# Patient Record
Sex: FEMALE | Race: WHITE | NOT HISPANIC OR LATINO
[De-identification: names, ages, dates, MRNs, and addresses within clinical notes are randomized per-mention and may not be internally consistent; named-entity substitution may affect disease eponyms.]

---

## 2017-02-07 ENCOUNTER — APPOINTMENT (OUTPATIENT)
Dept: ENDOCRINOLOGY | Facility: CLINIC | Age: 52
End: 2017-02-07

## 2017-02-07 ENCOUNTER — LABORATORY RESULT (OUTPATIENT)
Age: 52
End: 2017-02-07

## 2017-02-07 VITALS — DIASTOLIC BLOOD PRESSURE: 78 MMHG | HEART RATE: 78 BPM | SYSTOLIC BLOOD PRESSURE: 118 MMHG

## 2017-02-07 VITALS — HEIGHT: 61.5 IN | HEART RATE: 78 BPM | BODY MASS INDEX: 29.26 KG/M2 | WEIGHT: 157 LBS

## 2017-02-08 LAB
25(OH)D3 SERPL-MCNC: 23.1 NG/ML
ALBUMIN SERPL ELPH-MCNC: 4.5 G/DL
ALP BLD-CCNC: 83 U/L
ALT SERPL-CCNC: 38 U/L
ANION GAP SERPL CALC-SCNC: 16 MMOL/L
AST SERPL-CCNC: 28 U/L
BASOPHILS # BLD AUTO: 0.03 K/UL
BASOPHILS NFR BLD AUTO: 0.5 %
BILIRUB SERPL-MCNC: 0.4 MG/DL
BUN SERPL-MCNC: 12 MG/DL
CALCIUM SERPL-MCNC: 9.7 MG/DL
CHLORIDE SERPL-SCNC: 101 MMOL/L
CHOLEST SERPL-MCNC: 206 MG/DL
CHOLEST/HDLC SERPL: 3.6 RATIO
CO2 SERPL-SCNC: 23 MMOL/L
CREAT SERPL-MCNC: 0.68 MG/DL
EOSINOPHIL # BLD AUTO: 0.5 K/UL
EOSINOPHIL NFR BLD AUTO: 8.8 %
FSH SERPL-MCNC: 21.5 IU/L
GLUCOSE SERPL-MCNC: 97 MG/DL
HBA1C MFR BLD HPLC: 5.5 %
HCT VFR BLD CALC: 36.6 %
HDLC SERPL-MCNC: 57 MG/DL
HGB BLD-MCNC: 13.5 G/DL
IMM GRANULOCYTES NFR BLD AUTO: 0.2 %
LDLC SERPL CALC-MCNC: 119 MG/DL
LYMPHOCYTES # BLD AUTO: 1.67 K/UL
LYMPHOCYTES NFR BLD AUTO: 29.6 %
MAN DIFF?: NORMAL
MCHC RBC-ENTMCNC: 36.9 GM/DL
MCHC RBC-ENTMCNC: 37.8 PG
MCV RBC AUTO: 102.5 FL
MONOCYTES # BLD AUTO: 0.43 K/UL
MONOCYTES NFR BLD AUTO: 7.6 %
NEUTROPHILS # BLD AUTO: 3.01 K/UL
NEUTROPHILS NFR BLD AUTO: 53.3 %
PLATELET # BLD AUTO: 445 K/UL
POTASSIUM SERPL-SCNC: 4.1 MMOL/L
PROLACTIN SERPL-MCNC: 10.4 NG/ML
PROT SERPL-MCNC: 7.8 G/DL
RBC # BLD: 3.57 M/UL
RBC # FLD: 14 %
SODIUM SERPL-SCNC: 140 MMOL/L
TRIGL SERPL-MCNC: 149 MG/DL
WBC # FLD AUTO: 5.65 K/UL

## 2017-02-09 LAB
T4 FREE SERPL-MCNC: 1.3 NG/DL
T4 SERPL-MCNC: 7.3 UG/DL
TSH SERPL-ACNC: 0.56 UIU/ML

## 2017-07-31 ENCOUNTER — RX RENEWAL (OUTPATIENT)
Age: 52
End: 2017-07-31

## 2017-08-03 ENCOUNTER — APPOINTMENT (OUTPATIENT)
Dept: ENDOCRINOLOGY | Facility: CLINIC | Age: 52
End: 2017-08-03
Payer: COMMERCIAL

## 2017-08-03 ENCOUNTER — LABORATORY RESULT (OUTPATIENT)
Age: 52
End: 2017-08-03

## 2017-08-03 VITALS
HEART RATE: 80 BPM | DIASTOLIC BLOOD PRESSURE: 80 MMHG | WEIGHT: 146 LBS | SYSTOLIC BLOOD PRESSURE: 152 MMHG | BODY MASS INDEX: 27.21 KG/M2 | HEIGHT: 61.5 IN

## 2017-08-03 PROCEDURE — 99214 OFFICE O/P EST MOD 30 MIN: CPT | Mod: 25

## 2017-08-03 PROCEDURE — 36415 COLL VENOUS BLD VENIPUNCTURE: CPT

## 2017-08-07 LAB
25(OH)D3 SERPL-MCNC: 31.8 NG/ML
ALBUMIN SERPL ELPH-MCNC: 4.8 G/DL
ALP BLD-CCNC: 91 U/L
ALT SERPL-CCNC: 25 U/L
ANION GAP SERPL CALC-SCNC: 15 MMOL/L
AST SERPL-CCNC: 21 U/L
BASOPHILS # BLD AUTO: 0.03 K/UL
BASOPHILS NFR BLD AUTO: 0.5 %
BILIRUB SERPL-MCNC: 0.3 MG/DL
BUN SERPL-MCNC: 12 MG/DL
CALCIUM SERPL-MCNC: 10.1 MG/DL
CHLORIDE SERPL-SCNC: 102 MMOL/L
CHOLEST SERPL-MCNC: 218 MG/DL
CHOLEST/HDLC SERPL: 4 RATIO
CO2 SERPL-SCNC: 25 MMOL/L
CORTIS SERPL-MCNC: 9.3 UG/DL
CREAT SERPL-MCNC: 0.81 MG/DL
EOSINOPHIL # BLD AUTO: 0.51 K/UL
EOSINOPHIL NFR BLD AUTO: 7.8 %
FOLATE SERPL-MCNC: 19.3 NG/ML
GLUCOSE SERPL-MCNC: 95 MG/DL
HBA1C MFR BLD HPLC: 5.3 %
HCT VFR BLD CALC: 46.6 %
HDLC SERPL-MCNC: 54 MG/DL
HGB BLD-MCNC: 14.4 G/DL
IMM GRANULOCYTES NFR BLD AUTO: 0.2 %
LDLC SERPL CALC-MCNC: 131 MG/DL
LYMPHOCYTES # BLD AUTO: 1.95 K/UL
LYMPHOCYTES NFR BLD AUTO: 29.9 %
MAN DIFF?: NORMAL
MCHC RBC-ENTMCNC: 30.9 GM/DL
MCHC RBC-ENTMCNC: 32.4 PG
MCV RBC AUTO: 104.7 FL
MONOCYTES # BLD AUTO: 0.52 K/UL
MONOCYTES NFR BLD AUTO: 8 %
NEUTROPHILS # BLD AUTO: 3.51 K/UL
NEUTROPHILS NFR BLD AUTO: 53.6 %
PLATELET # BLD AUTO: 399 K/UL
POTASSIUM SERPL-SCNC: 4.6 MMOL/L
PROT SERPL-MCNC: 8 G/DL
RBC # BLD: 4.45 M/UL
RBC # FLD: 15.2 %
SODIUM SERPL-SCNC: 142 MMOL/L
T4 FREE SERPL-MCNC: 1.5 NG/DL
T4 SERPL-MCNC: 7.4 UG/DL
TRIGL SERPL-MCNC: 164 MG/DL
TSH SERPL-ACNC: 0.8 UIU/ML
VIT B12 SERPL-MCNC: 337 PG/ML
WBC # FLD AUTO: 6.53 K/UL

## 2018-02-06 ENCOUNTER — LABORATORY RESULT (OUTPATIENT)
Age: 53
End: 2018-02-06

## 2018-02-06 ENCOUNTER — APPOINTMENT (OUTPATIENT)
Dept: ENDOCRINOLOGY | Facility: CLINIC | Age: 53
End: 2018-02-06
Payer: COMMERCIAL

## 2018-02-06 VITALS
BODY MASS INDEX: 26.5 KG/M2 | DIASTOLIC BLOOD PRESSURE: 80 MMHG | HEIGHT: 62 IN | SYSTOLIC BLOOD PRESSURE: 134 MMHG | WEIGHT: 144 LBS

## 2018-02-06 LAB
GLUCOSE BLDC GLUCOMTR-MCNC: 90
HBA1C MFR BLD HPLC: 5.5

## 2018-02-06 PROCEDURE — 82962 GLUCOSE BLOOD TEST: CPT

## 2018-02-06 PROCEDURE — 99214 OFFICE O/P EST MOD 30 MIN: CPT | Mod: 25

## 2018-02-06 PROCEDURE — 83036 HEMOGLOBIN GLYCOSYLATED A1C: CPT | Mod: QW

## 2018-02-06 PROCEDURE — 36415 COLL VENOUS BLD VENIPUNCTURE: CPT

## 2018-02-06 RX ORDER — TIOTROPIUM BROMIDE INHALATION SPRAY 1.56 UG/1
1.25 SPRAY, METERED RESPIRATORY (INHALATION)
Refills: 0 | Status: ACTIVE | COMMUNITY
Start: 2018-02-06

## 2018-02-08 LAB
25(OH)D3 SERPL-MCNC: 27.9 NG/ML
ALBUMIN SERPL ELPH-MCNC: 4.8 G/DL
ALP BLD-CCNC: 89 U/L
ALT SERPL-CCNC: 17 U/L
ANION GAP SERPL CALC-SCNC: 12 MMOL/L
AST SERPL-CCNC: 21 U/L
BASOPHILS # BLD AUTO: 0 K/UL
BASOPHILS NFR BLD AUTO: 0 %
BILIRUB SERPL-MCNC: 0.2 MG/DL
BUN SERPL-MCNC: 14 MG/DL
CALCIUM SERPL-MCNC: 10 MG/DL
CHLORIDE SERPL-SCNC: 100 MMOL/L
CHOLEST SERPL-MCNC: 219 MG/DL
CHOLEST/HDLC SERPL: 3.5 RATIO
CO2 SERPL-SCNC: 28 MMOL/L
CREAT SERPL-MCNC: 0.73 MG/DL
EOSINOPHIL # BLD AUTO: 0.75 K/UL
EOSINOPHIL NFR BLD AUTO: 11 %
FERRITIN SERPL-MCNC: 103 NG/ML
FOLATE SERPL-MCNC: 17.4 NG/ML
GLUCOSE SERPL-MCNC: 103 MG/DL
HBA1C MFR BLD HPLC: 5.3 %
HCT VFR BLD CALC: 47.8 %
HDLC SERPL-MCNC: 63 MG/DL
HGB BLD-MCNC: 14.8 G/DL
IRON SATN MFR SERPL: 30 %
IRON SERPL-MCNC: 89 UG/DL
LDLC SERPL CALC-MCNC: 126 MG/DL
LYMPHOCYTES # BLD AUTO: 1.92 K/UL
LYMPHOCYTES NFR BLD AUTO: 28 %
MAN DIFF?: NORMAL
MCHC RBC-ENTMCNC: 31 GM/DL
MCHC RBC-ENTMCNC: 33.3 PG
MCV RBC AUTO: 107.7 FL
MONOCYTES # BLD AUTO: 0.55 K/UL
MONOCYTES NFR BLD AUTO: 8 %
NEUTROPHILS # BLD AUTO: 3.63 K/UL
NEUTROPHILS NFR BLD AUTO: 53 %
PLATELET # BLD AUTO: 394 K/UL
POTASSIUM SERPL-SCNC: 4.2 MMOL/L
PROT SERPL-MCNC: 7.7 G/DL
RBC # BLD: 4.44 M/UL
RBC # FLD: 15.3 %
SODIUM SERPL-SCNC: 140 MMOL/L
T4 FREE SERPL-MCNC: 1.6 NG/DL
T4 SERPL-MCNC: 7.9 UG/DL
TIBC SERPL-MCNC: 300 UG/DL
TRIGL SERPL-MCNC: 148 MG/DL
TSH SERPL-ACNC: 0.67 UIU/ML
UIBC SERPL-MCNC: 211 UG/DL
VIT B12 SERPL-MCNC: 339 PG/ML
WBC # FLD AUTO: 6.84 K/UL

## 2018-05-16 ENCOUNTER — RX RENEWAL (OUTPATIENT)
Age: 53
End: 2018-05-16

## 2018-08-08 ENCOUNTER — APPOINTMENT (OUTPATIENT)
Dept: ENDOCRINOLOGY | Facility: CLINIC | Age: 53
End: 2018-08-08
Payer: COMMERCIAL

## 2018-08-08 VITALS
HEIGHT: 61.5 IN | SYSTOLIC BLOOD PRESSURE: 158 MMHG | OXYGEN SATURATION: 96 % | WEIGHT: 150 LBS | DIASTOLIC BLOOD PRESSURE: 110 MMHG | HEART RATE: 70 BPM | BODY MASS INDEX: 27.96 KG/M2

## 2018-08-08 VITALS — DIASTOLIC BLOOD PRESSURE: 84 MMHG | SYSTOLIC BLOOD PRESSURE: 130 MMHG

## 2018-08-08 PROCEDURE — 36415 COLL VENOUS BLD VENIPUNCTURE: CPT

## 2018-08-08 PROCEDURE — 99214 OFFICE O/P EST MOD 30 MIN: CPT | Mod: 25

## 2018-08-09 LAB
25(OH)D3 SERPL-MCNC: 32.9 NG/ML
BASOPHILS # BLD AUTO: 0.04 K/UL
BASOPHILS NFR BLD AUTO: 0.5 %
EOSINOPHIL # BLD AUTO: 0.68 K/UL
EOSINOPHIL NFR BLD AUTO: 9.2 %
FSH SERPL-MCNC: 55.4 IU/L
HCT VFR BLD CALC: 43.5 %
HGB BLD-MCNC: 14.8 G/DL
IMM GRANULOCYTES NFR BLD AUTO: 0.1 %
LH SERPL-ACNC: 37.3 IU/L
LYMPHOCYTES # BLD AUTO: 1.98 K/UL
LYMPHOCYTES NFR BLD AUTO: 26.6 %
MAN DIFF?: NORMAL
MCHC RBC-ENTMCNC: 33.2 PG
MCHC RBC-ENTMCNC: 34 GM/DL
MCV RBC AUTO: 97.5 FL
MONOCYTES # BLD AUTO: 0.51 K/UL
MONOCYTES NFR BLD AUTO: 6.9 %
NEUTROPHILS # BLD AUTO: 4.21 K/UL
NEUTROPHILS NFR BLD AUTO: 56.7 %
PLATELET # BLD AUTO: 426 K/UL
PROLACTIN SERPL-MCNC: 5.9 NG/ML
RBC # BLD: 4.46 M/UL
RBC # FLD: 14.7 %
SAVE SPECIMEN: NORMAL
WBC # FLD AUTO: 7.43 K/UL

## 2018-08-13 LAB
ALBUMIN SERPL ELPH-MCNC: 5 G/DL
ALP BLD-CCNC: 90 U/L
ALT SERPL-CCNC: 21 U/L
ANION GAP SERPL CALC-SCNC: 19 MMOL/L
AST SERPL-CCNC: 20 U/L
BILIRUB SERPL-MCNC: 0.3 MG/DL
BUN SERPL-MCNC: 18 MG/DL
CALCIUM SERPL-MCNC: 10 MG/DL
CHLORIDE SERPL-SCNC: 98 MMOL/L
CHOLEST SERPL-MCNC: 237 MG/DL
CHOLEST/HDLC SERPL: 4.3 RATIO
CO2 SERPL-SCNC: 22 MMOL/L
CREAT SERPL-MCNC: 0.67 MG/DL
FOLATE SERPL-MCNC: 16.5 NG/ML
GLUCOSE SERPL-MCNC: 93 MG/DL
HDLC SERPL-MCNC: 55 MG/DL
LDLC SERPL CALC-MCNC: 147 MG/DL
POTASSIUM SERPL-SCNC: 4.9 MMOL/L
PROT SERPL-MCNC: 8.1 G/DL
SODIUM SERPL-SCNC: 139 MMOL/L
T4 FREE SERPL-MCNC: 1.3 NG/DL
T4 SERPL-MCNC: 7.7 UG/DL
TESTOST BND SERPL-MCNC: 2.1 PG/ML
TESTOST SERPL-MCNC: 8.9 NG/DL
TRIGL SERPL-MCNC: 176 MG/DL
TSH SERPL-ACNC: 0.74 UIU/ML
VIT B12 SERPL-MCNC: 275 PG/ML

## 2018-11-06 ENCOUNTER — TRANSCRIPTION ENCOUNTER (OUTPATIENT)
Age: 53
End: 2018-11-06

## 2018-12-03 ENCOUNTER — MEDICATION RENEWAL (OUTPATIENT)
Age: 53
End: 2018-12-03

## 2019-03-08 ENCOUNTER — RX RENEWAL (OUTPATIENT)
Age: 54
End: 2019-03-08

## 2019-04-01 ENCOUNTER — APPOINTMENT (OUTPATIENT)
Dept: ENDOCRINOLOGY | Facility: CLINIC | Age: 54
End: 2019-04-01
Payer: COMMERCIAL

## 2019-04-01 VITALS
HEART RATE: 83 BPM | WEIGHT: 150 LBS | DIASTOLIC BLOOD PRESSURE: 63 MMHG | SYSTOLIC BLOOD PRESSURE: 107 MMHG | HEIGHT: 61.5 IN | OXYGEN SATURATION: 94 % | BODY MASS INDEX: 27.96 KG/M2

## 2019-04-01 PROCEDURE — 99214 OFFICE O/P EST MOD 30 MIN: CPT

## 2019-04-01 NOTE — ASSESSMENT
[FreeTextEntry1] : Patient along standing history of Hashimoto's hypothyroidism. She also has had low vitamin D so we'll measure that as well.Her thyroid ultrasound shows a stable 5 mm nodule.BD did show osteoporosis, so she will do labs for secondary causes and f/u in one months. most likely will start a bisphosphonate. Need complete BD report.  [Importance of Diet and Exercise] : importance of diet and exercise to improve glycemic control, achieve weight loss and improve cardiovascular health [Levothyroxine] : The patient was instructed to take Levothyroxine on an empty stomach, separate from vitamins, and wait at least 30 minutes before eating

## 2019-04-01 NOTE — PHYSICAL EXAM
[Alert] : alert [No Acute Distress] : no acute distress [Well Nourished] : well nourished [Well Developed] : well developed [Normal Sclera/Conjunctiva] : normal sclera/conjunctiva [EOMI] : extra ocular movement intact [No Proptosis] : no proptosis [Normal Oropharynx] : the oropharynx was normal [Thyroid Not Enlarged] : the thyroid was not enlarged [No Thyroid Nodules] : there were no palpable thyroid nodules [No Respiratory Distress] : no respiratory distress [No Accessory Muscle Use] : no accessory muscle use [Clear to Auscultation] : lungs were clear to auscultation bilaterally [Normal Rate] : heart rate was normal  [Normal S1, S2] : normal S1 and S2 [Regular Rhythm] : with a regular rhythm [Pedal Pulses Normal] : the pedal pulses are present [No Edema] : there was no peripheral edema [Normal Bowel Sounds] : normal bowel sounds [Not Tender] : non-tender [Soft] : abdomen soft [Not Distended] : not distended [No Spinal Tenderness] : no spinal tenderness [Spine Straight] : spine straight [Normal Gait] : normal gait [Normal Strength/Tone] : muscle strength and tone were normal [Acanthosis Nigricans] : no acanthosis nigricans [Normal Reflexes] : deep tendon reflexes were 2+ and symmetric [No Tremors] : no tremors [Oriented x3] : oriented to person, place, and time [de-identified] : facial current erythema but not as dry , alopecia eyebrows lashes scalp hair

## 2019-04-01 NOTE — HISTORY OF PRESENT ILLNESS
[FreeTextEntry1] : Patient with a history of hypothyroidism and has been stable on the medication for the past few years. She is presently on Synthroid 88 mcg a day. She has been under a lot of stress since her father just passed away.  She also has alopecia areata with mild growth of hair on her scalp.She has had some exacerbation of her eczema recently. Her dermatologist has been talking to her about starting her on immunosuppression, which she is now on. Skin much improved except for breakout around eyes and face. ..Her menses had been irregular her last cycle was 12/2015.Since she has been doing a wheat free she feels her skin significantly better. She recently did a BD. She has no h/o of fractures but has been on and off steroids for asthma most of her life as well as chronic use of steroid inhalants.

## 2019-04-16 ENCOUNTER — LABORATORY RESULT (OUTPATIENT)
Age: 54
End: 2019-04-16

## 2019-05-08 ENCOUNTER — APPOINTMENT (OUTPATIENT)
Dept: ENDOCRINOLOGY | Facility: CLINIC | Age: 54
End: 2019-05-08
Payer: COMMERCIAL

## 2019-05-08 VITALS
HEART RATE: 80 BPM | HEIGHT: 61.5 IN | SYSTOLIC BLOOD PRESSURE: 127 MMHG | OXYGEN SATURATION: 93 % | DIASTOLIC BLOOD PRESSURE: 85 MMHG | WEIGHT: 149 LBS | BODY MASS INDEX: 27.77 KG/M2

## 2019-05-08 PROCEDURE — 99213 OFFICE O/P EST LOW 20 MIN: CPT

## 2019-05-08 NOTE — HISTORY OF PRESENT ILLNESS
[FreeTextEntry1] : Patient with a history of hypothyroidism and has been stable on the medication for the past few years. She is presently on Synthroid 88 mcg a day. She has been under a lot of stress since her father just passed away.  She also has alopecia areata with mild growth of hair on her scalp.She has had some exacerbation of her eczema recently. Her dermatologist has been talking to her about starting her on immunosuppression, which she is now on. Skin much improved except for breakout around eyes and face. ..Her menses had been irregular her last cycle was 12/2015.Since she has been doing a wheat free she feels her skin significantly better. She recently did a BD. She has no h/o of fractures but has been on and off steroids for asthma most of her life as well as chronic use of steroid inhalants. \par Recent labs to review for secondary cause of bone loss.

## 2019-05-08 NOTE — ASSESSMENT
[FreeTextEntry1] : Patient along standing history of Hashimoto's hypothyroidism. She also has had low vitamin D so we'll measure that as well.Her thyroid ultrasound shows a stable 5 mm nodule.BD did show osteoporosis, so she did labs for secondary causes . Overall normal. Will start Actonel monthly, reviewed how to take. f/u 3 months.  [Bisphosphonate Therapy] : Risks  and benefits of bisphosphonate therapy were  discussed with the patient including gastroesophageal irritation, osteonecrosis of the jaw, and atypical femur fractures, and acute phase reaction

## 2019-05-08 NOTE — PHYSICAL EXAM
[Alert] : alert [No Acute Distress] : no acute distress [Well Developed] : well developed [Well Nourished] : well nourished [Normal Sclera/Conjunctiva] : normal sclera/conjunctiva [EOMI] : extra ocular movement intact [Normal Oropharynx] : the oropharynx was normal [No Proptosis] : no proptosis [No Thyroid Nodules] : there were no palpable thyroid nodules [Thyroid Not Enlarged] : the thyroid was not enlarged [No Respiratory Distress] : no respiratory distress [Clear to Auscultation] : lungs were clear to auscultation bilaterally [No Accessory Muscle Use] : no accessory muscle use [Normal S1, S2] : normal S1 and S2 [Normal Rate] : heart rate was normal  [Regular Rhythm] : with a regular rhythm [Pedal Pulses Normal] : the pedal pulses are present [No Edema] : there was no peripheral edema [Normal Bowel Sounds] : normal bowel sounds [Soft] : abdomen soft [Not Tender] : non-tender [Not Distended] : not distended [No Spinal Tenderness] : no spinal tenderness [Normal Strength/Tone] : muscle strength and tone were normal [Spine Straight] : spine straight [Normal Gait] : normal gait [Acanthosis Nigricans] : no acanthosis nigricans [Oriented x3] : oriented to person, place, and time [No Tremors] : no tremors [Normal Reflexes] : deep tendon reflexes were 2+ and symmetric [de-identified] : facial current erythema but not as dry , alopecia eyebrows lashes scalp hair

## 2019-06-10 ENCOUNTER — RX RENEWAL (OUTPATIENT)
Age: 54
End: 2019-06-10

## 2019-08-05 LAB
25(OH)D3 SERPL-MCNC: 23.5 NG/ML
ALBUMIN SERPL ELPH-MCNC: 4.7 G/DL
ALP BLD-CCNC: 82 U/L
ALT SERPL-CCNC: 30 U/L
ANION GAP SERPL CALC-SCNC: 12 MMOL/L
AST SERPL-CCNC: 14 U/L
BILIRUB SERPL-MCNC: 0.3 MG/DL
BUN SERPL-MCNC: 13 MG/DL
CALCIUM SERPL-MCNC: 9.5 MG/DL
CHLORIDE SERPL-SCNC: 102 MMOL/L
CHOLEST SERPL-MCNC: 205 MG/DL
CHOLEST/HDLC SERPL: 3.9 RATIO
CO2 SERPL-SCNC: 25 MMOL/L
COLLAGEN CTX SERPL-MCNC: 126 PG/ML
CREAT SERPL-MCNC: 0.56 MG/DL
ESTIMATED AVERAGE GLUCOSE: 114 MG/DL
GLUCOSE SERPL-MCNC: 89 MG/DL
HBA1C MFR BLD HPLC: 5.6 %
HDLC SERPL-MCNC: 53 MG/DL
LDLC SERPL CALC-MCNC: 125 MG/DL
POTASSIUM SERPL-SCNC: 4.7 MMOL/L
PROT SERPL-MCNC: 7.6 G/DL
SAVE SPECIMEN: NORMAL
SODIUM SERPL-SCNC: 139 MMOL/L
T4 FREE SERPL-MCNC: 1.3 NG/DL
T4 SERPL-MCNC: 7.5 UG/DL
TRIGL SERPL-MCNC: 136 MG/DL
TSH SERPL-ACNC: 0.63 UIU/ML

## 2019-08-13 ENCOUNTER — TRANSCRIPTION ENCOUNTER (OUTPATIENT)
Age: 54
End: 2019-08-13

## 2019-08-14 ENCOUNTER — APPOINTMENT (OUTPATIENT)
Dept: ENDOCRINOLOGY | Facility: CLINIC | Age: 54
End: 2019-08-14
Payer: COMMERCIAL

## 2019-08-14 VITALS
SYSTOLIC BLOOD PRESSURE: 122 MMHG | OXYGEN SATURATION: 95 % | HEART RATE: 72 BPM | WEIGHT: 149 LBS | BODY MASS INDEX: 27.77 KG/M2 | DIASTOLIC BLOOD PRESSURE: 76 MMHG | HEIGHT: 61.5 IN

## 2019-08-14 PROCEDURE — 99214 OFFICE O/P EST MOD 30 MIN: CPT

## 2019-08-14 RX ORDER — DUPILUMAB 300 MG/2ML
300 INJECTION, SOLUTION SUBCUTANEOUS
Refills: 0 | Status: ACTIVE | COMMUNITY

## 2019-08-14 NOTE — HISTORY OF PRESENT ILLNESS
[FreeTextEntry1] : Patient with a history of hypothyroidism and has been stable on the medication for the past few years. She is presently on Synthroid 88 mcg a day. She has been under a lot of stress since her father just passed away.  She also has alopecia areata with mild growth of hair on her scalp.She has had some exacerbation of her eczema recently. Her dermatologist has been talking to her about starting her on immunosuppression, which she is now on. Skin much improved except for breakout around eyes and face. ..Her menses had been irregular her last cycle was 12/2015.Since she has been doing a wheat free she feels her skin significantly better. She recently did a BD. She has no h/o of fractures but has been on and off steroids for asthma most of her life as well as chronic use of steroid inhalants. \par She started Actonel 2 months ago without any side effects.

## 2019-08-14 NOTE — PHYSICAL EXAM
[Alert] : alert [No Acute Distress] : no acute distress [Well Nourished] : well nourished [Well Developed] : well developed [Normal Sclera/Conjunctiva] : normal sclera/conjunctiva [EOMI] : extra ocular movement intact [No Proptosis] : no proptosis [Normal Oropharynx] : the oropharynx was normal [No Thyroid Nodules] : there were no palpable thyroid nodules [Thyroid Not Enlarged] : the thyroid was not enlarged [No Respiratory Distress] : no respiratory distress [No Accessory Muscle Use] : no accessory muscle use [Clear to Auscultation] : lungs were clear to auscultation bilaterally [Normal Rate] : heart rate was normal  [Normal S1, S2] : normal S1 and S2 [Regular Rhythm] : with a regular rhythm [Pedal Pulses Normal] : the pedal pulses are present [Normal Bowel Sounds] : normal bowel sounds [No Edema] : there was no peripheral edema [Not Tender] : non-tender [Soft] : abdomen soft [No Spinal Tenderness] : no spinal tenderness [Not Distended] : not distended [Spine Straight] : spine straight [Normal Gait] : normal gait [Normal Strength/Tone] : muscle strength and tone were normal [Acanthosis Nigricans] : no acanthosis nigricans [Normal Reflexes] : deep tendon reflexes were 2+ and symmetric [No Tremors] : no tremors [Oriented x3] : oriented to person, place, and time [de-identified] : facial current erythema but not as dry , alopecia eyebrows lashes scalp hair

## 2019-08-14 NOTE — ASSESSMENT
[FreeTextEntry1] : Patient along standing history of Hashimoto's hypothyroidism. She also has had low vitamin D so we'll measure that as well.Her thyroid ultrasound shows a stable 5 mm nodule.BD did show osteoporosis, so she did labs for secondary causes . Overall normal. Will  continue  Actonel monthly, renewed . CTX adequately suppressed. f/u 6 months.  [Carbohydrate Consistent Diet] : carbohydrate consistent diet [Importance of Diet and Exercise] : importance of diet and exercise to improve glycemic control, achieve weight loss and improve cardiovascular health [Bisphosphonate Therapy] : Risks  and benefits of bisphosphonate therapy were  discussed with the patient including gastroesophageal irritation, osteonecrosis of the jaw, and atypical femur fractures, and acute phase reaction [Levothyroxine] : The patient was instructed to take Levothyroxine on an empty stomach, separate from vitamins, and wait at least 30 minutes before eating [Bisphosphonates] : The patient was instructed to take bisphosphonates on an empty stomach with a full glass of water,and wait at least 30 minutes before eating or lying down

## 2019-11-25 ENCOUNTER — MEDICATION RENEWAL (OUTPATIENT)
Age: 54
End: 2019-11-25

## 2019-12-09 ENCOUNTER — RX RENEWAL (OUTPATIENT)
Age: 54
End: 2019-12-09

## 2020-05-12 ENCOUNTER — APPOINTMENT (OUTPATIENT)
Dept: ENDOCRINOLOGY | Facility: CLINIC | Age: 55
End: 2020-05-12
Payer: SELF-PAY

## 2020-05-12 PROCEDURE — 99213 OFFICE O/P EST LOW 20 MIN: CPT | Mod: 95

## 2020-05-12 NOTE — ASSESSMENT
[FreeTextEntry1] : Patient along standing history of Hashimoto's hypothyroidism. She also has had low vitamin D so we'll measure that as well.Her thyroid ultrasound shows a stable 5 mm nodule.BD did show osteoporosis, so she did labs for secondary causes . Overall normal. Will  continue  Actonel monthly, renewed . CTX adequately suppressed. Is due for new labs. She feels Dupixent which she takes for asthma and eczema has helped with her alopecia.  [Bisphosphonate Therapy] : Risks and benefits of bisphosphonate therapy were  discussed with the patient including gastroesophageal irritation, osteonecrosis of the jaw, and atypical femur fractures, and acute phase reaction [Bisphosphonates] : The patient was instructed to take bisphosphonates on an empty stomach with a full glass of water,and wait at least 30 minutes before eating or lying down [Levothyroxine] : The patient was instructed to take Levothyroxine on an empty stomach, separate from vitamins, and wait at least 30 minutes before eating

## 2020-05-12 NOTE — PHYSICAL EXAM
[No Acute Distress] : no acute distress [Normal Voice/Communication] : normal voice communication [Alert] : alert [Normal Sclera/Conjunctiva] : normal sclera/conjunctiva [Thyroid Not Enlarged] : the thyroid was not enlarged [No Respiratory Distress] : no respiratory distress [Oriented x3] : oriented to person, place, and time [Normal Insight/Judgement] : insight and judgment were intact [de-identified] : on visual inspection  [de-identified] : more significant hair growth on scalp and eyebrows

## 2020-05-12 NOTE — HISTORY OF PRESENT ILLNESS
[Home] : at home, [unfilled] , at the time of the visit. [Patient] : the patient [Self] : self [Medical Office: ([facility] [place])___] : at the medical office located in  [FreeTextEntry1] : Patient with a history of hypothyroidism and has been stable on the medication for the past few years. She is presently on Synthroid 88 mcg a day. She also has alopecia areata with mild growth of hair on her scalp.She has had some exacerbation of her eczema recently. Her dermatologist has been talking to her about starting her on immunosuppression, which she is now on. Skin much improved except for breakout around eyes and face. .Since she has been doing a wheat free she feels her skin significantly better. She recently did a BD. She has no h/o of fractures but has been on and off steroids for asthma most of her life as well as chronic use of steroid inhalants. \par She started Actonel 6 months ago without any side effects. Some times she does feel fatigued for a few days, She is on Vitamin D and calcium supplements.

## 2020-05-12 NOTE — REASON FOR VISIT
[Follow - Up] : a follow-up visit [Hypothyroidism] : hypothyroidism [Osteoporosis] : osteoporosis [Other___] : [unfilled]

## 2020-06-12 ENCOUNTER — LABORATORY RESULT (OUTPATIENT)
Age: 55
End: 2020-06-12

## 2020-06-18 ENCOUNTER — TRANSCRIPTION ENCOUNTER (OUTPATIENT)
Age: 55
End: 2020-06-18

## 2020-06-18 LAB
25(OH)D3 SERPL-MCNC: 29.8 NG/ML
ALBUMIN SERPL ELPH-MCNC: 4.7 G/DL
ALP BLD-CCNC: 69 U/L
ALT SERPL-CCNC: 21 U/L
ANION GAP SERPL CALC-SCNC: 13 MMOL/L
AST SERPL-CCNC: 18 U/L
BASOPHILS # BLD AUTO: 0.08 K/UL
BASOPHILS NFR BLD AUTO: 1.1 %
BILIRUB SERPL-MCNC: 0.2 MG/DL
BUN SERPL-MCNC: 12 MG/DL
CALCIUM SERPL-MCNC: 9.9 MG/DL
CHLORIDE SERPL-SCNC: 102 MMOL/L
CHOLEST SERPL-MCNC: 200 MG/DL
CHOLEST/HDLC SERPL: 4 RATIO
CO2 SERPL-SCNC: 24 MMOL/L
COLLAGEN CTX SERPL-MCNC: 100 PG/ML
CREAT SERPL-MCNC: 0.65 MG/DL
EOSINOPHIL # BLD AUTO: 0.99 K/UL
EOSINOPHIL NFR BLD AUTO: 13.9 %
ESTIMATED AVERAGE GLUCOSE: 108 MG/DL
GLUCOSE SERPL-MCNC: 104 MG/DL
HBA1C MFR BLD HPLC: 5.4 %
HCT VFR BLD CALC: 43.6 %
HDLC SERPL-MCNC: 50 MG/DL
HGB BLD-MCNC: 13.9 G/DL
IMM GRANULOCYTES NFR BLD AUTO: 0.7 %
LDLC SERPL CALC-MCNC: 119 MG/DL
LYMPHOCYTES # BLD AUTO: 2.61 K/UL
LYMPHOCYTES NFR BLD AUTO: 36.6 %
MAN DIFF?: NORMAL
MCHC RBC-ENTMCNC: 31.9 GM/DL
MCHC RBC-ENTMCNC: 33.1 PG
MCV RBC AUTO: 103.8 FL
MONOCYTES # BLD AUTO: 0.56 K/UL
MONOCYTES NFR BLD AUTO: 7.9 %
NEUTROPHILS # BLD AUTO: 2.84 K/UL
NEUTROPHILS NFR BLD AUTO: 39.8 %
PLATELET # BLD AUTO: 409 K/UL
POTASSIUM SERPL-SCNC: 4.4 MMOL/L
PROT SERPL-MCNC: 7.3 G/DL
RBC # BLD: 4.2 M/UL
RBC # FLD: 14.5 %
SAVE SPECIMEN: NORMAL
SODIUM SERPL-SCNC: 140 MMOL/L
T4 FREE SERPL-MCNC: 1.4 NG/DL
T4 SERPL-MCNC: 8.3 UG/DL
TRIGL SERPL-MCNC: 161 MG/DL
TSH SERPL-ACNC: 0.98 UIU/ML
WBC # FLD AUTO: 7.13 K/UL

## 2020-08-25 ENCOUNTER — APPOINTMENT (OUTPATIENT)
Dept: ENDOCRINOLOGY | Facility: CLINIC | Age: 55
End: 2020-08-25
Payer: COMMERCIAL

## 2020-08-25 VITALS
BODY MASS INDEX: 27.21 KG/M2 | SYSTOLIC BLOOD PRESSURE: 112 MMHG | HEIGHT: 61.5 IN | WEIGHT: 146 LBS | HEART RATE: 65 BPM | OXYGEN SATURATION: 93 % | DIASTOLIC BLOOD PRESSURE: 72 MMHG

## 2020-08-25 VITALS
DIASTOLIC BLOOD PRESSURE: 72 MMHG | BODY MASS INDEX: 27.21 KG/M2 | OXYGEN SATURATION: 93 % | HEART RATE: 65 BPM | WEIGHT: 146 LBS | SYSTOLIC BLOOD PRESSURE: 112 MMHG | HEIGHT: 61.5 IN

## 2020-08-25 LAB
GLUCOSE BLDC GLUCOMTR-MCNC: 94
HBA1C MFR BLD HPLC: 5.4

## 2020-08-25 PROCEDURE — 83036 HEMOGLOBIN GLYCOSYLATED A1C: CPT | Mod: QW

## 2020-08-25 PROCEDURE — 82962 GLUCOSE BLOOD TEST: CPT | Mod: NC

## 2020-08-25 PROCEDURE — 99213 OFFICE O/P EST LOW 20 MIN: CPT | Mod: 25

## 2020-08-25 NOTE — HISTORY OF PRESENT ILLNESS
[FreeTextEntry1] : Patient with a history of hypothyroidism and has been stable on the medication for the past few years. She is presently on Synthroid 88 mcg a day. She also has alopecia areata with mild growth of hair on her scalp.She has had some exacerbation of her eczema recently. Her dermatologist has been talking to her about starting her on immunosuppression, which she is now on. Skin much improved except for breakout around eyes and face. .Since she has been doing a wheat free she feels her skin significantly better. \par  BD showed osteoporosis.  She has no h/o of fractures but has been on and off steroids for asthma most of her life as well as chronic use of steroid inhalants. \par She started Actonel 12 months ago without any side effects. Some times she does feel fatigued for a few days, She is on Vitamin D and calcium supplements.

## 2020-08-25 NOTE — ASSESSMENT
[Bisphosphonate Therapy] : Risks and benefits of bisphosphonate therapy were  discussed with the patient including gastroesophageal irritation, osteonecrosis of the jaw, and atypical femur fractures, and acute phase reaction [FreeTextEntry1] : Patient along standing history of Hashimoto's hypothyroidism. She also has had low vitamin D so we'll measure that as well.Her thyroid ultrasound shows a stable 5 mm nodule.BD did show osteoporosis, so she did labs for secondary causes . Overall normal. Will  continue  Actonel monthly, renewed . CTX adequately suppressed. Is due for new labs. She feels Dupixent which she takes for asthma and eczema has helped with her alopecia. To do BD in march.  [Bisphosphonates] : The patient was instructed to take bisphosphonates on an empty stomach with a full glass of water,and wait at least 30 minutes before eating or lying down [Levothyroxine] : The patient was instructed to take Levothyroxine on an empty stomach, separate from vitamins, and wait at least 30 minutes before eating

## 2020-08-25 NOTE — PHYSICAL EXAM
[Alert] : alert [Normal Sclera/Conjunctiva] : normal sclera/conjunctiva [No Acute Distress] : no acute distress [Normal Voice/Communication] : normal voice communication [PERRL] : pupils equal, round and reactive to light [No Thyroid Nodules] : no palpable thyroid nodules [Thyroid Not Enlarged] : the thyroid was not enlarged [Normal PMI] : the apical impulse was normal [Clear to Auscultation] : lungs were clear to auscultation bilaterally [No Respiratory Distress] : no respiratory distress [Normal Rate] : heart rate was normal [Normal Bowel Sounds] : normal bowel sounds [Normal S1, S2] : normal S1 and S2 [Not Tender] : non-tender [Normal Gait] : normal gait [Soft] : abdomen soft [No Joint Swelling] : no joint swelling seen [Oriented x3] : oriented to person, place, and time [Normal Insight/Judgement] : insight and judgment were intact [de-identified] : more significant hair growth on scalp and eyebrows

## 2020-08-31 ENCOUNTER — RX RENEWAL (OUTPATIENT)
Age: 55
End: 2020-08-31

## 2020-09-22 ENCOUNTER — RX RENEWAL (OUTPATIENT)
Age: 55
End: 2020-09-22

## 2021-02-26 ENCOUNTER — LABORATORY RESULT (OUTPATIENT)
Age: 56
End: 2021-02-26

## 2021-03-01 LAB
25(OH)D3 SERPL-MCNC: 21.7 NG/ML
ALBUMIN SERPL ELPH-MCNC: 4.8 G/DL
ALP BLD-CCNC: 76 U/L
ALT SERPL-CCNC: 26 U/L
ANION GAP SERPL CALC-SCNC: 13 MMOL/L
AST SERPL-CCNC: 19 U/L
BASOPHILS # BLD AUTO: 0.06 K/UL
BASOPHILS NFR BLD AUTO: 0.8 %
BILIRUB SERPL-MCNC: 0.4 MG/DL
BUN SERPL-MCNC: 11 MG/DL
CALCIUM SERPL-MCNC: 9.5 MG/DL
CHLORIDE SERPL-SCNC: 102 MMOL/L
CHOLEST SERPL-MCNC: 213 MG/DL
CO2 SERPL-SCNC: 24 MMOL/L
CREAT SERPL-MCNC: 0.72 MG/DL
EOSINOPHIL # BLD AUTO: 0.6 K/UL
EOSINOPHIL NFR BLD AUTO: 7.9 %
ESTIMATED AVERAGE GLUCOSE: 108 MG/DL
GLUCOSE SERPL-MCNC: 88 MG/DL
HBA1C MFR BLD HPLC: 5.4 %
HCT VFR BLD CALC: 42.4 %
HDLC SERPL-MCNC: 55 MG/DL
HGB BLD-MCNC: 14.1 G/DL
IMM GRANULOCYTES NFR BLD AUTO: 0.3 %
LDLC SERPL CALC-MCNC: 132 MG/DL
LYMPHOCYTES # BLD AUTO: 2.78 K/UL
LYMPHOCYTES NFR BLD AUTO: 36.6 %
MAN DIFF?: NORMAL
MCHC RBC-ENTMCNC: 33 PG
MCHC RBC-ENTMCNC: 33.3 GM/DL
MCV RBC AUTO: 99.3 FL
MONOCYTES # BLD AUTO: 0.59 K/UL
MONOCYTES NFR BLD AUTO: 7.8 %
NEUTROPHILS # BLD AUTO: 3.55 K/UL
NEUTROPHILS NFR BLD AUTO: 46.6 %
NONHDLC SERPL-MCNC: 159 MG/DL
PLATELET # BLD AUTO: 408 K/UL
POTASSIUM SERPL-SCNC: 4.4 MMOL/L
PROT SERPL-MCNC: 7.5 G/DL
RBC # BLD: 4.27 M/UL
RBC # FLD: 13.5 %
SODIUM SERPL-SCNC: 139 MMOL/L
T4 FREE SERPL-MCNC: 1.7 NG/DL
T4 SERPL-MCNC: 8.7 UG/DL
TRIGL SERPL-MCNC: 134 MG/DL
TSH SERPL-ACNC: 0.31 UIU/ML
WBC # FLD AUTO: 7.6 K/UL

## 2021-03-17 ENCOUNTER — APPOINTMENT (OUTPATIENT)
Dept: ENDOCRINOLOGY | Facility: CLINIC | Age: 56
End: 2021-03-17

## 2021-03-22 ENCOUNTER — RX RENEWAL (OUTPATIENT)
Age: 56
End: 2021-03-22

## 2021-04-20 ENCOUNTER — RESULT REVIEW (OUTPATIENT)
Age: 56
End: 2021-04-20

## 2021-05-11 ENCOUNTER — APPOINTMENT (OUTPATIENT)
Dept: ENDOCRINOLOGY | Facility: CLINIC | Age: 56
End: 2021-05-11
Payer: COMMERCIAL

## 2021-05-11 VITALS
TEMPERATURE: 99.5 F | SYSTOLIC BLOOD PRESSURE: 120 MMHG | WEIGHT: 155 LBS | DIASTOLIC BLOOD PRESSURE: 78 MMHG | HEART RATE: 75 BPM | HEIGHT: 61.5 IN | OXYGEN SATURATION: 97 % | BODY MASS INDEX: 28.89 KG/M2

## 2021-05-11 PROCEDURE — 99072 ADDL SUPL MATRL&STAF TM PHE: CPT

## 2021-05-11 PROCEDURE — 99214 OFFICE O/P EST MOD 30 MIN: CPT

## 2021-05-11 RX ORDER — RISEDRONATE SODIUM 150 MG/1
150 TABLET, FILM COATED ORAL
Qty: 1 | Refills: 0 | Status: COMPLETED | COMMUNITY
Start: 2019-05-08 | End: 2021-05-11

## 2021-05-11 NOTE — HISTORY OF PRESENT ILLNESS
[FreeTextEntry1] : Patient with a history of hypothyroidism and has been stable on the medication for the past few years. She is presently on Synthroid 88 mcg a day. She also has alopecia areata with mild growth of hair on her scalp. Eczema and alopecia much improved except  on Dupixent.. .Since she has been doing a wheat free she feels her skin significantly better. \par  BD showed osteoporosis.  She has no h/o of fractures but has been on and off steroids for asthma most of her life as well as chronic use of steroid inhalants. \par She started Actonel 2 years  ago without any side effects. Some times she does feel fatigued for a few days, She is on Vitamin D and calcium supplements. Recently did BD and thyroid sonogram.

## 2021-05-11 NOTE — ASSESSMENT
[Levothyroxine] : The patient was instructed to take Levothyroxine on an empty stomach, separate from vitamins, and wait at least 30 minutes before eating [FreeTextEntry1] : Patient along standing history of Hashimoto's hypothyroidism. She also has had low vitamin D will be more compliant with taking. ll.Her thyroid ultrasound shows a stable 5 mm nodule.BD did show osteoporosis which despite 2 years on Actonel did worsen in the spine but was better in the hips. Lowest site T score - 3.5 L3 was - 3.3. Discussed change to Forteo or Tymlos but does not want to do daily injection. Will consider Prolia. \par She feels Dupixent which she takes for asthma and eczema has helped with her alopecia. \par To check BTM off Actonel. Check Covid spike protein Ab.

## 2021-05-11 NOTE — PHYSICAL EXAM
[Alert] : alert [No Acute Distress] : no acute distress [Normal Voice/Communication] : normal voice communication [Normal Sclera/Conjunctiva] : normal sclera/conjunctiva [PERRL] : pupils equal, round and reactive to light [Thyroid Not Enlarged] : the thyroid was not enlarged [No Thyroid Nodules] : no palpable thyroid nodules [No Respiratory Distress] : no respiratory distress [Clear to Auscultation] : lungs were clear to auscultation bilaterally [Normal PMI] : the apical impulse was normal [Normal S1, S2] : normal S1 and S2 [Normal Rate] : heart rate was normal [Normal Bowel Sounds] : normal bowel sounds [Not Tender] : non-tender [Soft] : abdomen soft [Normal Gait] : normal gait [No Joint Swelling] : no joint swelling seen [Oriented x3] : oriented to person, place, and time [Normal Insight/Judgement] : insight and judgment were intact [de-identified] : more significant hair growth on scalp and eyebrows and now eyelashes

## 2021-07-27 LAB
CALCIUM SERPL-MCNC: 9.9 MG/DL
PARATHYROID HORMONE INTACT: 33 PG/ML
T4 FREE SERPL-MCNC: 1.6 NG/DL
T4 SERPL-MCNC: 9.1 UG/DL
TSH SERPL-ACNC: 0.25 UIU/ML

## 2021-08-02 LAB
ALP BONE SERPL-MCNC: 14.8 UG/L
COLLAGEN CTX SERPL-MCNC: 183 PG/ML
COVID-19 SPIKE DOMAIN ANTIBODY INTERPRETATION: POSITIVE
SARS-COV-2 AB SERPL IA-ACNC: >250 U/ML

## 2021-08-03 ENCOUNTER — APPOINTMENT (OUTPATIENT)
Dept: ENDOCRINOLOGY | Facility: CLINIC | Age: 56
End: 2021-08-03
Payer: COMMERCIAL

## 2021-08-03 VITALS
BODY MASS INDEX: 27.65 KG/M2 | SYSTOLIC BLOOD PRESSURE: 122 MMHG | HEART RATE: 65 BPM | OXYGEN SATURATION: 95 % | HEIGHT: 61.5 IN | DIASTOLIC BLOOD PRESSURE: 69 MMHG | WEIGHT: 148.38 LBS

## 2021-08-03 PROCEDURE — 99214 OFFICE O/P EST MOD 30 MIN: CPT

## 2021-08-03 NOTE — ASSESSMENT
[FreeTextEntry1] : Patient along standing history of Hashimoto's hypothyroidism.  Will continue synthroid 88 ug but skip one pill.mos. She also has had low vitamin D will be more compliant with taking. ll.Her thyroid ultrasound shows a stable 5 mm nodule.BD did show osteoporosis which despite 2 years on Actonel did worsen in the spine but was better in the hips. Lowest site T score - 3.5 L3 was - 3.3. Discussed change to Forteo or Tymlos but does not want to do daily injection. Will consider Prolia. Resent to specialty pharmacy. \par She feels Dupixent which she takes for asthma and eczema has helped with her alopecia. \par Covid spike protein > 250.  [Levothyroxine] : The patient was instructed to take Levothyroxine on an empty stomach, separate from vitamins, and wait at least 30 minutes before eating

## 2021-08-03 NOTE — PHYSICAL EXAM
[Alert] : alert [No Acute Distress] : no acute distress [Normal Voice/Communication] : normal voice communication [Normal Sclera/Conjunctiva] : normal sclera/conjunctiva [PERRL] : pupils equal, round and reactive to light [Thyroid Not Enlarged] : the thyroid was not enlarged [No Thyroid Nodules] : no palpable thyroid nodules [No Respiratory Distress] : no respiratory distress [Clear to Auscultation] : lungs were clear to auscultation bilaterally [Normal PMI] : the apical impulse was normal [Normal S1, S2] : normal S1 and S2 [Normal Rate] : heart rate was normal [Normal Bowel Sounds] : normal bowel sounds [Not Tender] : non-tender [Soft] : abdomen soft [Normal Gait] : normal gait [No Joint Swelling] : no joint swelling seen [Oriented x3] : oriented to person, place, and time [Normal Insight/Judgement] : insight and judgment were intact [de-identified] : more significant hair growth on scalp and eyebrows and now eyelashes

## 2021-08-03 NOTE — HISTORY OF PRESENT ILLNESS
[FreeTextEntry1] : Patient with a history of hypothyroidism and has been stable on the medication for the past few years. She is presently on Synthroid 88 mcg a day. She also has alopecia areata with mild growth of hair on her scalp. Eczema and alopecia much improved except  on Dupixent.. .Since she has been doing a wheat free she feels her skin significantly better. \par  BD showed osteoporosis.  She has no h/o of fractures but has been on and off steroids for asthma most of her life as well as chronic use of steroid inhalants. \par She started Actonel 2 years  ago without any side effects. Some times she does feel fatigued for a few days, She is on Vitamin D and calcium supplements. BD recently done somewhat worse.

## 2021-11-08 ENCOUNTER — RX RENEWAL (OUTPATIENT)
Age: 56
End: 2021-11-08

## 2021-11-23 DIAGNOSIS — Z01.812 ENCOUNTER FOR PREPROCEDURAL LABORATORY EXAMINATION: ICD-10-CM

## 2021-12-07 LAB — SARS-COV-2 N GENE NPH QL NAA+PROBE: NOT DETECTED

## 2021-12-09 ENCOUNTER — NON-APPOINTMENT (OUTPATIENT)
Age: 56
End: 2021-12-09

## 2021-12-10 ENCOUNTER — APPOINTMENT (OUTPATIENT)
Dept: PULMONOLOGY | Facility: CLINIC | Age: 56
End: 2021-12-10
Payer: COMMERCIAL

## 2021-12-10 VITALS
OXYGEN SATURATION: 95 % | HEART RATE: 86 BPM | TEMPERATURE: 98.5 F | DIASTOLIC BLOOD PRESSURE: 81 MMHG | SYSTOLIC BLOOD PRESSURE: 142 MMHG

## 2021-12-10 DIAGNOSIS — J45.909 UNSPECIFIED ASTHMA, UNCOMPLICATED: ICD-10-CM

## 2021-12-10 LAB — POCT - HEMOGLOBIN (HGB), QUANTITATIVE, TRANSCUTANEOUS: 14.2

## 2021-12-10 PROCEDURE — 94729 DIFFUSING CAPACITY: CPT

## 2021-12-10 PROCEDURE — 88738 HGB QUANT TRANSCUTANEOUS: CPT

## 2021-12-10 PROCEDURE — 94060 EVALUATION OF WHEEZING: CPT

## 2021-12-10 PROCEDURE — 99204 OFFICE O/P NEW MOD 45 MIN: CPT | Mod: 25

## 2021-12-10 PROCEDURE — ZZZZZ: CPT

## 2021-12-10 PROCEDURE — 95012 NITRIC OXIDE EXP GAS DETER: CPT

## 2021-12-10 PROCEDURE — 94727 GAS DIL/WSHOT DETER LNG VOL: CPT

## 2021-12-10 RX ORDER — FLUTICASONE PROPIONATE AND SALMETEROL 500; 50 UG/1; UG/1
500-50 POWDER RESPIRATORY (INHALATION)
Refills: 0 | Status: ACTIVE | COMMUNITY

## 2021-12-11 RX ORDER — TIOTROPIUM BROMIDE INHALATION SPRAY 3.12 UG/1
2.5 SPRAY, METERED RESPIRATORY (INHALATION)
Qty: 1 | Refills: 2 | Status: ACTIVE | OUTPATIENT
Start: 2021-12-11

## 2021-12-11 NOTE — HISTORY OF PRESENT ILLNESS
[Never] : never [TextBox_4] : Referred for abnormal spirometry and SOB\par Long standing asthma and allergies as well as eczema\par Started on Dupixent for  eczema-taking since 2019\par still with abnl mara at allergist\par \par was on pred 1-2 times a year before there. No steroid need since starting Dupixent\par

## 2021-12-11 NOTE — ASSESSMENT
[FreeTextEntry1] : Asthma with element of chronic airflow limitation\par increase Spiriva to "COPD" dose 2.5; repeat mara in about 6 weeks\par Chronic airflow limitation likely due to long standing asthma; may not have any further reversibility

## 2022-01-21 ENCOUNTER — APPOINTMENT (OUTPATIENT)
Dept: PULMONOLOGY | Facility: CLINIC | Age: 57
End: 2022-01-21

## 2022-02-01 ENCOUNTER — APPOINTMENT (OUTPATIENT)
Dept: ENDOCRINOLOGY | Facility: CLINIC | Age: 57
End: 2022-02-01
Payer: COMMERCIAL

## 2022-02-01 VITALS
OXYGEN SATURATION: 96 % | BODY MASS INDEX: 29.08 KG/M2 | HEART RATE: 75 BPM | SYSTOLIC BLOOD PRESSURE: 145 MMHG | WEIGHT: 156 LBS | DIASTOLIC BLOOD PRESSURE: 77 MMHG | HEIGHT: 61.5 IN

## 2022-02-01 LAB
GLUCOSE BLDC GLUCOMTR-MCNC: 116
HBA1C MFR BLD HPLC: 5.4

## 2022-02-01 PROCEDURE — 83036 HEMOGLOBIN GLYCOSYLATED A1C: CPT | Mod: QW

## 2022-02-01 PROCEDURE — 99214 OFFICE O/P EST MOD 30 MIN: CPT | Mod: 25

## 2022-02-01 PROCEDURE — 82962 GLUCOSE BLOOD TEST: CPT | Mod: NC

## 2022-02-01 NOTE — HISTORY OF PRESENT ILLNESS
[FreeTextEntry1] : Patient with a history of hypothyroidism and has been stable on the medication for the past few years. She is presently on Synthroid 88 mcg a day. She also has alopecia areata with mild growth of hair on her scalp. Eczema and alopecia much improved except  on Dupixent.. .Since she has been doing a wheat free she feels her skin significantly better. \par  BD showed osteoporosis.  She has no h/o of fractures but has been on and off steroids for asthma most of her life as well as chronic use of steroid inhalants. \par She started Actonel 2 years  ago without any side effects. Some times she does feel fatigued for a few days, She is on Vitamin D and calcium supplements. BD recently done somewhat worse. Was to start Prolia and was just approved.

## 2022-02-01 NOTE — ASSESSMENT
[FreeTextEntry1] : Patient along standing history of Hashimoto's hypothyroidism.on Synthroid 88 ug but skip one pill.mos. She also has had low vitamin D will be more compliant with taking. ll.Her thyroid ultrasound shows a stable 5 mm nodule.BD did show osteoporosis which despite 2 years on Actonel did worsen in the spine but was better in the hips. Lowest site T score - 3.5 L3 was - 3.3. Discussed change to Forteo or Tymlos but does not want to do daily injection. Will start  Prolia. Apparently now approved. . \par She feels Dupixent which she takes for asthma and eczema has helped with her alopecia. \par To do labs. \par  [Denosumab Therapy] : Risks  and benefits of denosumab therapy were discussed with the patient including eczema, cellulitis, osteonecrosis of the jaw and atypical femur fractures [Levothyroxine] : The patient was instructed to take Levothyroxine on an empty stomach, separate from vitamins, and wait at least 30 minutes before eating

## 2022-02-01 NOTE — PHYSICAL EXAM
[Alert] : alert [No Acute Distress] : no acute distress [Normal Voice/Communication] : normal voice communication [Normal Sclera/Conjunctiva] : normal sclera/conjunctiva [PERRL] : pupils equal, round and reactive to light [Thyroid Not Enlarged] : the thyroid was not enlarged [No Thyroid Nodules] : no palpable thyroid nodules [No Respiratory Distress] : no respiratory distress [Clear to Auscultation] : lungs were clear to auscultation bilaterally [Normal PMI] : the apical impulse was normal [Normal S1, S2] : normal S1 and S2 [Normal Rate] : heart rate was normal [Normal Bowel Sounds] : normal bowel sounds [Not Tender] : non-tender [Soft] : abdomen soft [Normal Gait] : normal gait [No Joint Swelling] : no joint swelling seen [Oriented x3] : oriented to person, place, and time [Normal Insight/Judgement] : insight and judgment were intact [de-identified] : more significant hair growth on scalp and eyebrows and now eyelashes

## 2022-02-15 LAB
25(OH)D3 SERPL-MCNC: 13.2 NG/ML
ALBUMIN SERPL ELPH-MCNC: 4.9 G/DL
ALP BLD-CCNC: 90 U/L
ALT SERPL-CCNC: 27 U/L
ANION GAP SERPL CALC-SCNC: 13 MMOL/L
AST SERPL-CCNC: 16 U/L
BILIRUB SERPL-MCNC: 0.4 MG/DL
BUN SERPL-MCNC: 16 MG/DL
CALCIUM SERPL-MCNC: 10.3 MG/DL
CHLORIDE SERPL-SCNC: 102 MMOL/L
CHOLEST SERPL-MCNC: 213 MG/DL
CO2 SERPL-SCNC: 26 MMOL/L
COLLAGEN CTX SERPL-MCNC: 192 PG/ML
CREAT SERPL-MCNC: 0.69 MG/DL
GLUCOSE SERPL-MCNC: 101 MG/DL
HDLC SERPL-MCNC: 59 MG/DL
LDLC SERPL CALC-MCNC: 123 MG/DL
NONHDLC SERPL-MCNC: 154 MG/DL
POTASSIUM SERPL-SCNC: 4.4 MMOL/L
PROT SERPL-MCNC: 7.7 G/DL
SODIUM SERPL-SCNC: 142 MMOL/L
T4 FREE SERPL-MCNC: 1.6 NG/DL
T4 SERPL-MCNC: 8.6 UG/DL
TRIGL SERPL-MCNC: 155 MG/DL
TSH SERPL-ACNC: 0.83 UIU/ML

## 2022-02-25 ENCOUNTER — APPOINTMENT (OUTPATIENT)
Dept: ENDOCRINOLOGY | Facility: CLINIC | Age: 57
End: 2022-02-25
Payer: COMMERCIAL

## 2022-02-25 VITALS
DIASTOLIC BLOOD PRESSURE: 78 MMHG | WEIGHT: 155 LBS | BODY MASS INDEX: 29.27 KG/M2 | HEIGHT: 61 IN | HEART RATE: 65 BPM | OXYGEN SATURATION: 97 % | SYSTOLIC BLOOD PRESSURE: 126 MMHG

## 2022-02-25 PROCEDURE — 96401 CHEMO ANTI-NEOPL SQ/IM: CPT

## 2022-02-27 RX ORDER — DENOSUMAB 60 MG/ML
60 INJECTION SUBCUTANEOUS
Qty: 1 | Refills: 0 | Status: COMPLETED | OUTPATIENT
Start: 2022-02-27

## 2022-02-27 RX ADMIN — DENOSUMAB 0 MG/ML: 60 INJECTION SUBCUTANEOUS at 00:00

## 2022-02-27 NOTE — HISTORY OF PRESENT ILLNESS
[FreeTextEntry1] : Patient with osteoporosis presents for her Prolia injection. \par Presently taking Calcium and VItamin D.

## 2022-02-27 NOTE — ASSESSMENT
[FreeTextEntry1] : Pt received 1 ml (60 mg) of Prolia from her own supply into R upper arm subq w/o incident or complaints. Pt tolerated the procedure well. Will return in 6 months for next injection.\par \par  [Denosumab Therapy] : Risks  and benefits of denosumab therapy were discussed with the patient including eczema, cellulitis, osteonecrosis of the jaw and atypical femur fractures

## 2022-06-07 ENCOUNTER — APPOINTMENT (OUTPATIENT)
Dept: ENDOCRINOLOGY | Facility: CLINIC | Age: 57
End: 2022-06-07
Payer: COMMERCIAL

## 2022-06-07 VITALS
DIASTOLIC BLOOD PRESSURE: 81 MMHG | HEART RATE: 72 BPM | HEIGHT: 61 IN | BODY MASS INDEX: 30.11 KG/M2 | WEIGHT: 159.5 LBS | OXYGEN SATURATION: 95 % | SYSTOLIC BLOOD PRESSURE: 132 MMHG

## 2022-06-07 DIAGNOSIS — Z92.29 PERSONAL HISTORY OF OTHER DRUG THERAPY: ICD-10-CM

## 2022-06-07 PROCEDURE — 99214 OFFICE O/P EST MOD 30 MIN: CPT | Mod: 25

## 2022-06-07 PROCEDURE — 36415 COLL VENOUS BLD VENIPUNCTURE: CPT

## 2022-06-07 NOTE — PHYSICAL EXAM
[Alert] : alert [No Acute Distress] : no acute distress [Normal Voice/Communication] : normal voice communication [Normal Sclera/Conjunctiva] : normal sclera/conjunctiva [PERRL] : pupils equal, round and reactive to light [Thyroid Not Enlarged] : the thyroid was not enlarged [No Thyroid Nodules] : no palpable thyroid nodules [No Respiratory Distress] : no respiratory distress [Clear to Auscultation] : lungs were clear to auscultation bilaterally [Normal PMI] : the apical impulse was normal [Normal S1, S2] : normal S1 and S2 [Normal Rate] : heart rate was normal [Normal Bowel Sounds] : normal bowel sounds [Not Tender] : non-tender [Soft] : abdomen soft [Normal Gait] : normal gait [No Joint Swelling] : no joint swelling seen [Oriented x3] : oriented to person, place, and time [Normal Insight/Judgement] : insight and judgment were intact [de-identified] : more significant hair growth on scalp and eyebrows and now eyelashes

## 2022-06-07 NOTE — HISTORY OF PRESENT ILLNESS
[FreeTextEntry1] : Patient with a history of hypothyroidism and has been stable on the medication for the past few years. She is presently on Synthroid 88 mcg a day. She also has alopecia areata with mild growth of hair on her scalp. Eczema and alopecia much improved except  on Dupixent.. .Since she has been doing a wheat free she feels her skin significantly better. \par  BD showed osteoporosis.  She has no h/o of fractures but has been on and off steroids for asthma most of her life as well as chronic use of steroid inhalants. \par She started Actonel 2 years  ago without any side effects. Some times she does feel fatigued for a few days, She is on Vitamin D and calcium supplements. BD recently done somewhat worse. Recently started  Prolia and had no reaction.

## 2022-06-07 NOTE — ASSESSMENT
[FreeTextEntry1] : Patient along standing history of Hashimoto's hypothyroidism.on Synthroid 88 ug but skip one pill.mos. She also has had low vitamin D will be more compliant with taking. ll.Her thyroid ultrasound shows a stable 5 mm nodule.BD did show osteoporosis which despite 2 years on Actonel did worsen in the spine but was better in the hips. Lowest site T score - 3.5 L3 was - 3.3. Discussed change to Forteo or Tymlos but does not want to do daily injection. Started  Prolia.F/U due in August. encourage weight bearing exercise vitamin D and calcium supplement\par . \par She feels Dupixent which she takes for asthma and eczema has helped with her alopecia. \par To do labs. \par  [Denosumab Therapy] : Risks  and benefits of denosumab therapy were discussed with the patient including eczema, cellulitis, osteonecrosis of the jaw and atypical femur fractures [Levothyroxine] : The patient was instructed to take Levothyroxine on an empty stomach, separate from vitamins, and wait at least 30 minutes before eating

## 2022-06-13 ENCOUNTER — RX RENEWAL (OUTPATIENT)
Age: 57
End: 2022-06-13

## 2022-08-04 ENCOUNTER — RX RENEWAL (OUTPATIENT)
Age: 57
End: 2022-08-04

## 2022-08-09 LAB
25(OH)D3 SERPL-MCNC: 18.6 NG/ML
ALBUMIN SERPL ELPH-MCNC: 5 G/DL
ALP BLD-CCNC: 83 U/L
ALT SERPL-CCNC: 49 U/L
ANION GAP SERPL CALC-SCNC: 13 MMOL/L
AST SERPL-CCNC: 27 U/L
BASOPHILS # BLD AUTO: 0.06 K/UL
BASOPHILS NFR BLD AUTO: 0.8 %
BILIRUB SERPL-MCNC: 0.3 MG/DL
BUN SERPL-MCNC: 14 MG/DL
CALCIUM SERPL-MCNC: 9.7 MG/DL
CHLORIDE SERPL-SCNC: 101 MMOL/L
CHOLEST SERPL-MCNC: 228 MG/DL
CO2 SERPL-SCNC: 23 MMOL/L
CREAT SERPL-MCNC: 0.62 MG/DL
EGFR: 103 ML/MIN/1.73M2
EOSINOPHIL # BLD AUTO: 0.27 K/UL
EOSINOPHIL NFR BLD AUTO: 3.6 %
GLUCOSE SERPL-MCNC: 101 MG/DL
HCT VFR BLD CALC: 44.5 %
HDLC SERPL-MCNC: 47 MG/DL
HGB BLD-MCNC: 14.8 G/DL
IMM GRANULOCYTES NFR BLD AUTO: 0.1 %
LDLC SERPL CALC-MCNC: 135 MG/DL
LYMPHOCYTES # BLD AUTO: 2.85 K/UL
LYMPHOCYTES NFR BLD AUTO: 37.7 %
MAN DIFF?: NORMAL
MCHC RBC-ENTMCNC: 33.1 PG
MCHC RBC-ENTMCNC: 33.3 GM/DL
MCV RBC AUTO: 99.6 FL
MONOCYTES # BLD AUTO: 0.51 K/UL
MONOCYTES NFR BLD AUTO: 6.7 %
NEUTROPHILS # BLD AUTO: 3.86 K/UL
NEUTROPHILS NFR BLD AUTO: 51.1 %
NONHDLC SERPL-MCNC: 181 MG/DL
PLATELET # BLD AUTO: 392 K/UL
POTASSIUM SERPL-SCNC: 4.6 MMOL/L
PROT SERPL-MCNC: 8 G/DL
RBC # BLD: 4.47 M/UL
RBC # FLD: 13.7 %
SODIUM SERPL-SCNC: 137 MMOL/L
T4 FREE SERPL-MCNC: 1.5 NG/DL
T4 SERPL-MCNC: 8.8 UG/DL
TRIGL SERPL-MCNC: 229 MG/DL
TSH SERPL-ACNC: 0.42 UIU/ML
WBC # FLD AUTO: 7.56 K/UL

## 2022-08-10 ENCOUNTER — NON-APPOINTMENT (OUTPATIENT)
Age: 57
End: 2022-08-10

## 2022-08-24 ENCOUNTER — APPOINTMENT (OUTPATIENT)
Dept: ENDOCRINOLOGY | Facility: CLINIC | Age: 57
End: 2022-08-24

## 2022-08-24 VITALS
OXYGEN SATURATION: 94 % | HEART RATE: 65 BPM | HEIGHT: 61 IN | BODY MASS INDEX: 29.9 KG/M2 | SYSTOLIC BLOOD PRESSURE: 136 MMHG | WEIGHT: 158.38 LBS | DIASTOLIC BLOOD PRESSURE: 77 MMHG

## 2022-08-24 PROCEDURE — 96401 CHEMO ANTI-NEOPL SQ/IM: CPT

## 2022-08-24 RX ADMIN — DENOSUMAB 0 MG/ML: 60 INJECTION SUBCUTANEOUS at 00:00

## 2022-08-25 RX ORDER — DENOSUMAB 60 MG/ML
60 INJECTION SUBCUTANEOUS
Qty: 1 | Refills: 0 | Status: COMPLETED | OUTPATIENT
Start: 2022-08-24

## 2022-12-06 ENCOUNTER — NON-APPOINTMENT (OUTPATIENT)
Age: 57
End: 2022-12-06

## 2022-12-06 ENCOUNTER — LABORATORY RESULT (OUTPATIENT)
Age: 57
End: 2022-12-06

## 2022-12-06 ENCOUNTER — APPOINTMENT (OUTPATIENT)
Dept: ENDOCRINOLOGY | Facility: CLINIC | Age: 57
End: 2022-12-06

## 2022-12-06 VITALS
HEART RATE: 81 BPM | DIASTOLIC BLOOD PRESSURE: 77 MMHG | SYSTOLIC BLOOD PRESSURE: 131 MMHG | HEIGHT: 61 IN | WEIGHT: 157.38 LBS | BODY MASS INDEX: 29.71 KG/M2 | OXYGEN SATURATION: 93 %

## 2022-12-06 DIAGNOSIS — Z87.42 PERSONAL HISTORY OF OTHER DISEASES OF THE FEMALE GENITAL TRACT: ICD-10-CM

## 2022-12-06 PROCEDURE — 99214 OFFICE O/P EST MOD 30 MIN: CPT | Mod: 25

## 2022-12-06 PROCEDURE — 36415 COLL VENOUS BLD VENIPUNCTURE: CPT

## 2022-12-06 NOTE — PHYSICAL EXAM
[Alert] : alert [No Acute Distress] : no acute distress [Normal Voice/Communication] : normal voice communication [Normal Sclera/Conjunctiva] : normal sclera/conjunctiva [PERRL] : pupils equal, round and reactive to light [Thyroid Not Enlarged] : the thyroid was not enlarged [No Thyroid Nodules] : no palpable thyroid nodules [No Respiratory Distress] : no respiratory distress [Clear to Auscultation] : lungs were clear to auscultation bilaterally [Normal PMI] : the apical impulse was normal [Normal S1, S2] : normal S1 and S2 [Normal Rate] : heart rate was normal [Normal Bowel Sounds] : normal bowel sounds [Not Tender] : non-tender [Soft] : abdomen soft [Normal Gait] : normal gait [No Joint Swelling] : no joint swelling seen [Oriented x3] : oriented to person, place, and time [Normal Insight/Judgement] : insight and judgment were intact [de-identified] : more significant hair growth on scalp and eyebrows and now eyelashes

## 2022-12-06 NOTE — ASSESSMENT
[FreeTextEntry1] : Patient along standing history of Hashimoto's hypothyroidism.on Synthroid 88 ug but skip one pill.mos. She also has had low vitamin D will be more compliant with taking. ll.Her thyroid ultrasound shows a stable 5 mm nodule.BD did show osteoporosis which despite 2 years on Actonel did worsen in the spine but was better in the hips. Lowest site T score - 3.5 L3 was - 3.3. Discussed change to Forteo or Tymlos but does not want to do daily injection. Started  Prolia.F/U due in 2/23 . encourage weight bearing exercise vitamin D and calcium supplement\par . \par She feels Dupixent which she takes for asthma and eczema has helped with her alopecia. \par To do labs today and BD 3/23 f/u 4 mos . \par  [Denosumab Therapy] : Risks  and benefits of denosumab therapy were discussed with the patient including eczema, cellulitis, osteonecrosis of the jaw and atypical femur fractures [Levothyroxine] : The patient was instructed to take Levothyroxine on an empty stomach, separate from vitamins, and wait at least 30 minutes before eating

## 2022-12-08 ENCOUNTER — TRANSCRIPTION ENCOUNTER (OUTPATIENT)
Age: 57
End: 2022-12-08

## 2022-12-08 LAB
25(OH)D3 SERPL-MCNC: 18.4 NG/ML
ALBUMIN SERPL ELPH-MCNC: 4.7 G/DL
ALP BLD-CCNC: 77 U/L
ALT SERPL-CCNC: 55 U/L
ANION GAP SERPL CALC-SCNC: 12 MMOL/L
APPEARANCE: CLEAR
AST SERPL-CCNC: 28 U/L
BASOPHILS # BLD AUTO: 0.07 K/UL
BASOPHILS NFR BLD AUTO: 1.1 %
BILIRUB SERPL-MCNC: 0.3 MG/DL
BILIRUBIN URINE: NEGATIVE
BLOOD URINE: ABNORMAL
BUN SERPL-MCNC: 15 MG/DL
CALCIUM SERPL-MCNC: 9.8 MG/DL
CHLORIDE SERPL-SCNC: 103 MMOL/L
CHOLEST SERPL-MCNC: 223 MG/DL
CO2 SERPL-SCNC: 24 MMOL/L
COLOR: YELLOW
CREAT SERPL-MCNC: 0.64 MG/DL
EGFR: 102 ML/MIN/1.73M2
EOSINOPHIL # BLD AUTO: 0.23 K/UL
EOSINOPHIL NFR BLD AUTO: 3.7 %
GLUCOSE QUALITATIVE U: NEGATIVE
GLUCOSE SERPL-MCNC: 108 MG/DL
HCT VFR BLD CALC: 43.6 %
HDLC SERPL-MCNC: 47 MG/DL
HGB BLD-MCNC: 14.7 G/DL
IMM GRANULOCYTES NFR BLD AUTO: 0.5 %
KETONES URINE: NEGATIVE
LDLC SERPL CALC-MCNC: 139 MG/DL
LEUKOCYTE ESTERASE URINE: ABNORMAL
LYMPHOCYTES # BLD AUTO: 2.23 K/UL
LYMPHOCYTES NFR BLD AUTO: 35.7 %
MAN DIFF?: NORMAL
MCHC RBC-ENTMCNC: 33.5 PG
MCHC RBC-ENTMCNC: 33.7 GM/DL
MCV RBC AUTO: 99.3 FL
MONOCYTES # BLD AUTO: 0.5 K/UL
MONOCYTES NFR BLD AUTO: 8 %
NEUTROPHILS # BLD AUTO: 3.19 K/UL
NEUTROPHILS NFR BLD AUTO: 51 %
NITRITE URINE: NEGATIVE
NONHDLC SERPL-MCNC: 175 MG/DL
PH URINE: 7
PLATELET # BLD AUTO: 363 K/UL
POTASSIUM SERPL-SCNC: 4.4 MMOL/L
PROT SERPL-MCNC: 7.7 G/DL
PROTEIN URINE: NORMAL
RBC # BLD: 4.39 M/UL
RBC # FLD: 13.8 %
SODIUM SERPL-SCNC: 138 MMOL/L
SPECIFIC GRAVITY URINE: 1.02
T4 FREE SERPL-MCNC: 1.5 NG/DL
T4 SERPL-MCNC: 9.2 UG/DL
TRIGL SERPL-MCNC: 183 MG/DL
UROBILINOGEN URINE: NORMAL
WBC # FLD AUTO: 6.25 K/UL

## 2022-12-09 ENCOUNTER — NON-APPOINTMENT (OUTPATIENT)
Age: 57
End: 2022-12-09

## 2022-12-12 ENCOUNTER — NON-APPOINTMENT (OUTPATIENT)
Age: 57
End: 2022-12-12

## 2022-12-12 ENCOUNTER — TRANSCRIPTION ENCOUNTER (OUTPATIENT)
Age: 57
End: 2022-12-12

## 2022-12-12 LAB — COLLAGEN CTX SERPL-MCNC: 54 PG/ML

## 2023-02-22 ENCOUNTER — APPOINTMENT (OUTPATIENT)
Dept: ENDOCRINOLOGY | Facility: CLINIC | Age: 58
End: 2023-02-22

## 2023-03-09 ENCOUNTER — APPOINTMENT (OUTPATIENT)
Dept: ENDOCRINOLOGY | Facility: CLINIC | Age: 58
End: 2023-03-09
Payer: COMMERCIAL

## 2023-03-09 VITALS
OXYGEN SATURATION: 92 % | WEIGHT: 158 LBS | DIASTOLIC BLOOD PRESSURE: 86 MMHG | BODY MASS INDEX: 29.83 KG/M2 | HEART RATE: 88 BPM | SYSTOLIC BLOOD PRESSURE: 140 MMHG | HEIGHT: 61 IN

## 2023-03-09 PROCEDURE — 96401 CHEMO ANTI-NEOPL SQ/IM: CPT

## 2023-03-09 RX ORDER — DENOSUMAB 60 MG/ML
60 INJECTION SUBCUTANEOUS
Qty: 60 | Refills: 0 | Status: COMPLETED | OUTPATIENT
Start: 2023-03-09

## 2023-04-18 ENCOUNTER — APPOINTMENT (OUTPATIENT)
Dept: ENDOCRINOLOGY | Facility: CLINIC | Age: 58
End: 2023-04-18
Payer: COMMERCIAL

## 2023-04-18 VITALS
WEIGHT: 158 LBS | DIASTOLIC BLOOD PRESSURE: 76 MMHG | HEIGHT: 61 IN | HEART RATE: 66 BPM | OXYGEN SATURATION: 96 % | SYSTOLIC BLOOD PRESSURE: 138 MMHG | BODY MASS INDEX: 29.83 KG/M2

## 2023-04-18 PROCEDURE — 36415 COLL VENOUS BLD VENIPUNCTURE: CPT

## 2023-04-18 PROCEDURE — 99214 OFFICE O/P EST MOD 30 MIN: CPT | Mod: 25

## 2023-04-18 NOTE — ASSESSMENT
[Denosumab Therapy] : Risks  and benefits of denosumab therapy were discussed with the patient including eczema, cellulitis, osteonecrosis of the jaw and atypical femur fractures [Levothyroxine] : The patient was instructed to take Levothyroxine on an empty stomach, separate from vitamins, and wait at least 30 minutes before eating [FreeTextEntry1] : Patient along standing history of Hashimoto's hypothyroidism.on Synthroid 88 ug but skip one pill.mos. She also has had low vitamin D will be more compliant with taking. ll.Her thyroid ultrasound shows a stable 5 mm nodule.BD did show osteoporosis which despite 2 years on Actonel did worsen in the spine but was better in the hips. Lowest site T score - 3.5 L3 was - 3.3. Discussed change to Forteo or Tymlos but does not want to do daily injection. Started  Prolia.F/U due in 9/23 . encourage weight bearing exercise vitamin D and calcium supplement\par BD has not shown nay significant change; just had 3 rd shot  Prolia last mos will try to repeat one year. \par . \par She feels Dupixent which she takes for asthma and eczema has helped with her alopecia. \par To do labs today

## 2023-04-18 NOTE — PHYSICAL EXAM
[Alert] : alert [No Acute Distress] : no acute distress [Normal Voice/Communication] : normal voice communication [Normal Sclera/Conjunctiva] : normal sclera/conjunctiva [PERRL] : pupils equal, round and reactive to light [Thyroid Not Enlarged] : the thyroid was not enlarged [No Thyroid Nodules] : no palpable thyroid nodules [No Respiratory Distress] : no respiratory distress [Clear to Auscultation] : lungs were clear to auscultation bilaterally [Normal PMI] : the apical impulse was normal [Normal S1, S2] : normal S1 and S2 [Normal Rate] : heart rate was normal [Normal Bowel Sounds] : normal bowel sounds [Not Tender] : non-tender [Soft] : abdomen soft [Normal Gait] : normal gait [No Joint Swelling] : no joint swelling seen [Oriented x3] : oriented to person, place, and time [Normal Insight/Judgement] : insight and judgment were intact [de-identified] : more significant hair growth on scalp and eyebrows and now eyelashes

## 2023-05-11 ENCOUNTER — RX RENEWAL (OUTPATIENT)
Age: 58
End: 2023-05-11

## 2023-05-11 LAB
25(OH)D3 SERPL-MCNC: 22.5 NG/ML
ALBUMIN SERPL ELPH-MCNC: 5.1 G/DL
ALP BLD-CCNC: 83 U/L
ALT SERPL-CCNC: 44 U/L
ANION GAP SERPL CALC-SCNC: 12 MMOL/L
AST SERPL-CCNC: 24 U/L
BILIRUB SERPL-MCNC: 0.3 MG/DL
BUN SERPL-MCNC: 11 MG/DL
CALCIUM SERPL-MCNC: 10.1 MG/DL
CHLORIDE SERPL-SCNC: 103 MMOL/L
CHOLEST SERPL-MCNC: 219 MG/DL
CO2 SERPL-SCNC: 24 MMOL/L
COLLAGEN CTX SERPL-MCNC: 62 PG/ML
CREAT SERPL-MCNC: 0.59 MG/DL
EGFR: 104 ML/MIN/1.73M2
ESTIMATED AVERAGE GLUCOSE: 108 MG/DL
GLUCOSE SERPL-MCNC: 110 MG/DL
HBA1C MFR BLD HPLC: 5.4 %
HDLC SERPL-MCNC: 53 MG/DL
LDLC SERPL CALC-MCNC: 132 MG/DL
NONHDLC SERPL-MCNC: 166 MG/DL
POTASSIUM SERPL-SCNC: 4.3 MMOL/L
PROT SERPL-MCNC: 7.9 G/DL
SODIUM SERPL-SCNC: 139 MMOL/L
T4 FREE SERPL-MCNC: 1.7 NG/DL
T4 SERPL-MCNC: 10.8 UG/DL
TRIGL SERPL-MCNC: 165 MG/DL
TSH SERPL-ACNC: 0.36 UIU/ML

## 2023-05-11 RX ORDER — DENOSUMAB 60 MG/ML
60 INJECTION SUBCUTANEOUS
Qty: 1 | Refills: 1 | Status: ACTIVE | COMMUNITY
Start: 2021-05-11 | End: 1900-01-01

## 2023-05-12 ENCOUNTER — NON-APPOINTMENT (OUTPATIENT)
Age: 58
End: 2023-05-12

## 2023-08-03 ENCOUNTER — APPOINTMENT (OUTPATIENT)
Dept: ORTHOPEDIC SURGERY | Facility: CLINIC | Age: 58
End: 2023-08-03
Payer: COMMERCIAL

## 2023-08-03 VITALS — HEIGHT: 61 IN | BODY MASS INDEX: 29.83 KG/M2 | WEIGHT: 158 LBS

## 2023-08-03 PROCEDURE — 99214 OFFICE O/P EST MOD 30 MIN: CPT | Mod: 25

## 2023-08-03 PROCEDURE — 29125 APPL SHORT ARM SPLINT STATIC: CPT | Mod: LT

## 2023-08-03 NOTE — HISTORY OF PRESENT ILLNESS
[de-identified] : 8/3/23:  Pt injured her left wrist on 7/29/23.  Pt went to [facility] and presents in a splint with external xrays.

## 2023-08-03 NOTE — ASSESSMENT
[FreeTextEntry1] : We discussed the pathology at length. The patient understands that the distal radius is malaligned/shortened/articular surface is disrupted. They would benefit from ORIF to restore length alignment and rotation.  We discussed the post op protocol regarding immobilization, therapy, time off from work etc.  Risks, benefits, and alternatives of surgery discussed with patient (and family). Risks including but not limited to infection, blood loss, tendon damage and delayed tendon disruption, muscle damage, nerve damage, stiffness, pain, arthritis, malunion, nonunion, hardware failure, loss of function, potential for secondary surgery, loss of limb or life. The patient had the opportunity to ask questions and all questions were answered to their satisfaction.  They agree to surgery.

## 2023-08-03 NOTE — DATA REVIEWED
[Outside X-rays] : outside x-rays [Left] : left [Wrist] : wrist [I independently reviewed and interpreted images and report] : I independently reviewed and interpreted images and report [FreeTextEntry1] : displaced distal radius fracture

## 2023-08-10 RX ORDER — HYDROCODONE BITARTRATE AND ACETAMINOPHEN 5; 325 MG/1; MG/1
5-325 TABLET ORAL EVERY 4 HOURS
Qty: 30 | Refills: 0 | Status: ACTIVE | COMMUNITY
Start: 2023-08-10 | End: 1900-01-01

## 2023-08-11 ENCOUNTER — APPOINTMENT (OUTPATIENT)
Age: 58
End: 2023-08-11
Payer: COMMERCIAL

## 2023-08-11 PROCEDURE — 25609 OPTX DST RD XART FX/EP SEP3+: CPT | Mod: AS,LT

## 2023-08-11 PROCEDURE — 25280 REVISE WRIST/FOREARM TENDON: CPT | Mod: 59,LT

## 2023-08-11 PROCEDURE — 25280 REVISE WRIST/FOREARM TENDON: CPT | Mod: AS,59,LT

## 2023-08-11 PROCEDURE — 29125 APPL SHORT ARM SPLINT STATIC: CPT | Mod: 59,LT

## 2023-08-11 PROCEDURE — 25609 OPTX DST RD XART FX/EP SEP3+: CPT | Mod: LT

## 2023-08-21 ENCOUNTER — APPOINTMENT (OUTPATIENT)
Dept: ORTHOPEDIC SURGERY | Facility: CLINIC | Age: 58
End: 2023-08-21
Payer: COMMERCIAL

## 2023-08-21 VITALS — BODY MASS INDEX: 29.83 KG/M2 | HEIGHT: 61 IN | WEIGHT: 158 LBS

## 2023-08-21 PROCEDURE — 99024 POSTOP FOLLOW-UP VISIT: CPT

## 2023-08-21 PROCEDURE — 73110 X-RAY EXAM OF WRIST: CPT | Mod: LT

## 2023-08-21 NOTE — HISTORY OF PRESENT ILLNESS
[de-identified] : DOS: 8/11/23- LEFT DISTAL RADIUS ORIF  8/21/23:  Pt reports that her pain is improving.  8/3/23:  Pt injured her left wrist on 7/29/23.  Pt went to [facility] and presents in a splint with external xrays.

## 2023-08-21 NOTE — IMAGING
[de-identified] : wound clean dry and intact no erythema no drainage stiffness NV unchanged sutures removed [Left] : left wrist [No loss of surgical correlation. Bony alignment acceptable. Hardware in appropriate position] : No loss of surgical correlation. Bony alignment acceptable. Hardware in appropriate position [The fracture is in acceptable alignment. There is progression in healing seen] : The fracture is in acceptable alignment. There is progression in healing seen

## 2023-09-12 ENCOUNTER — APPOINTMENT (OUTPATIENT)
Dept: ENDOCRINOLOGY | Facility: CLINIC | Age: 58
End: 2023-09-12
Payer: COMMERCIAL

## 2023-09-12 VITALS
DIASTOLIC BLOOD PRESSURE: 86 MMHG | HEIGHT: 61.75 IN | SYSTOLIC BLOOD PRESSURE: 144 MMHG | BODY MASS INDEX: 27.6 KG/M2 | OXYGEN SATURATION: 95 % | WEIGHT: 150 LBS | HEART RATE: 72 BPM

## 2023-09-12 VITALS
HEART RATE: 72 BPM | SYSTOLIC BLOOD PRESSURE: 144 MMHG | OXYGEN SATURATION: 95 % | WEIGHT: 150 LBS | DIASTOLIC BLOOD PRESSURE: 86 MMHG | HEIGHT: 61.75 IN | BODY MASS INDEX: 27.6 KG/M2

## 2023-09-12 PROCEDURE — 36415 COLL VENOUS BLD VENIPUNCTURE: CPT

## 2023-09-12 PROCEDURE — 96401 CHEMO ANTI-NEOPL SQ/IM: CPT

## 2023-09-12 PROCEDURE — 99214 OFFICE O/P EST MOD 30 MIN: CPT | Mod: 25

## 2023-09-12 RX ORDER — DENOSUMAB 60 MG/ML
60 INJECTION SUBCUTANEOUS
Qty: 0 | Refills: 0 | Status: COMPLETED | OUTPATIENT
Start: 2023-09-12

## 2023-09-12 RX ADMIN — DENOSUMAB 60 MG/ML: 60 INJECTION SUBCUTANEOUS at 00:00

## 2023-09-18 ENCOUNTER — APPOINTMENT (OUTPATIENT)
Dept: ORTHOPEDIC SURGERY | Facility: CLINIC | Age: 58
End: 2023-09-18
Payer: COMMERCIAL

## 2023-09-18 VITALS — BODY MASS INDEX: 28.32 KG/M2 | HEIGHT: 61 IN | WEIGHT: 150 LBS

## 2023-09-18 DIAGNOSIS — S52.572A OTHER INTRAARTICULAR FRACTURE OF LOWER END OF LEFT RADIUS, INITIAL ENCOUNTER FOR CLOSED FRACTURE: ICD-10-CM

## 2023-09-18 LAB
25(OH)D3 SERPL-MCNC: 30.1 NG/ML
ALBUMIN SERPL ELPH-MCNC: 5.2 G/DL
ALP BLD-CCNC: 89 U/L
ALT SERPL-CCNC: 23 U/L
ANION GAP SERPL CALC-SCNC: 16 MMOL/L
AST SERPL-CCNC: 16 U/L
BILIRUB SERPL-MCNC: 0.3 MG/DL
BUN SERPL-MCNC: 13 MG/DL
CALCIUM SERPL-MCNC: 10.6 MG/DL
CHLORIDE SERPL-SCNC: 100 MMOL/L
CHOLEST SERPL-MCNC: 210 MG/DL
CO2 SERPL-SCNC: 24 MMOL/L
CREAT SERPL-MCNC: 0.61 MG/DL
EGFR: 103 ML/MIN/1.73M2
GLUCOSE SERPL-MCNC: 100 MG/DL
HDLC SERPL-MCNC: 52 MG/DL
LDLC SERPL CALC-MCNC: 132 MG/DL
NONHDLC SERPL-MCNC: 158 MG/DL
POTASSIUM SERPL-SCNC: 4.2 MMOL/L
PROT SERPL-MCNC: 8.1 G/DL
SODIUM SERPL-SCNC: 140 MMOL/L
T4 FREE SERPL-MCNC: 1.8 NG/DL
T4 SERPL-MCNC: 10.2 UG/DL
TRIGL SERPL-MCNC: 142 MG/DL
TSH SERPL-ACNC: 0.36 UIU/ML

## 2023-09-18 PROCEDURE — 73110 X-RAY EXAM OF WRIST: CPT | Mod: LT

## 2023-09-18 PROCEDURE — 99024 POSTOP FOLLOW-UP VISIT: CPT

## 2023-09-19 LAB — COLLAGEN CTX SERPL-MCNC: 149 PG/ML

## 2023-11-13 ENCOUNTER — APPOINTMENT (OUTPATIENT)
Dept: ORTHOPEDIC SURGERY | Facility: CLINIC | Age: 58
End: 2023-11-13

## 2023-12-04 ENCOUNTER — RX RENEWAL (OUTPATIENT)
Age: 58
End: 2023-12-04

## 2024-03-12 ENCOUNTER — APPOINTMENT (OUTPATIENT)
Dept: ENDOCRINOLOGY | Facility: CLINIC | Age: 59
End: 2024-03-12
Payer: COMMERCIAL

## 2024-03-12 VITALS
WEIGHT: 151 LBS | HEIGHT: 61 IN | HEART RATE: 67 BPM | DIASTOLIC BLOOD PRESSURE: 75 MMHG | OXYGEN SATURATION: 96 % | SYSTOLIC BLOOD PRESSURE: 135 MMHG | BODY MASS INDEX: 28.51 KG/M2

## 2024-03-12 DIAGNOSIS — E03.9 HYPOTHYROIDISM, UNSPECIFIED: ICD-10-CM

## 2024-03-12 DIAGNOSIS — Z78.0 ASYMPTOMATIC MENOPAUSAL STATE: ICD-10-CM

## 2024-03-12 DIAGNOSIS — M81.0 AGE-RELATED OSTEOPOROSIS W/OUT CURRENT PATHOLOGICAL FRACTURE: ICD-10-CM

## 2024-03-12 DIAGNOSIS — E04.9 NONTOXIC GOITER, UNSPECIFIED: ICD-10-CM

## 2024-03-12 DIAGNOSIS — E78.5 HYPERLIPIDEMIA, UNSPECIFIED: ICD-10-CM

## 2024-03-12 DIAGNOSIS — R79.89 OTHER SPECIFIED ABNORMAL FINDINGS OF BLOOD CHEMISTRY: ICD-10-CM

## 2024-03-12 DIAGNOSIS — L65.9 NONSCARRING HAIR LOSS, UNSPECIFIED: ICD-10-CM

## 2024-03-12 PROCEDURE — 96401 CHEMO ANTI-NEOPL SQ/IM: CPT

## 2024-03-12 PROCEDURE — 36415 COLL VENOUS BLD VENIPUNCTURE: CPT

## 2024-03-12 PROCEDURE — ZZZZZ: CPT

## 2024-03-12 PROCEDURE — 99214 OFFICE O/P EST MOD 30 MIN: CPT | Mod: 25

## 2024-03-12 RX ORDER — DENOSUMAB 60 MG/ML
60 INJECTION SUBCUTANEOUS
Qty: 0 | Refills: 0 | Status: COMPLETED | OUTPATIENT
Start: 2024-03-12

## 2024-03-12 RX ADMIN — DENOSUMAB 60 MG/ML: 60 INJECTION SUBCUTANEOUS at 00:00

## 2024-03-12 NOTE — HISTORY OF PRESENT ILLNESS
[FreeTextEntry1] : Patient with a history of hypothyroidism and has been stable on the medication for the past few years. She is presently on Synthroid 88 mcg a day. She also has alopecia areata with mild growth of hair on her scalp. Eczema and alopecia much improved except  on Dupixent.. .Since she has been doing a wheat free she feels her skin significantly better.   BD showed osteoporosis.  She has no h/o of fractures but has been on and off steroids for asthma most of her life as well as chronic use of steroid inhalants.  She started Actonel 2 years ago without any side effects. Sometimes she does feel fatigued for a few days, She is on Vitamin D and calcium supplements. BD recently done somewhat worse. Recently started Prolia and had no reaction.  Fell a few weeks ago and FX her left wrist.

## 2024-03-12 NOTE — PHYSICAL EXAM
[Alert] : alert [No Acute Distress] : no acute distress [Normal Voice/Communication] : normal voice communication [Normal Sclera/Conjunctiva] : normal sclera/conjunctiva [PERRL] : pupils equal, round and reactive to light [Thyroid Not Enlarged] : the thyroid was not enlarged [No Thyroid Nodules] : no palpable thyroid nodules [No Respiratory Distress] : no respiratory distress [Clear to Auscultation] : lungs were clear to auscultation bilaterally [Normal PMI] : the apical impulse was normal [Normal S1, S2] : normal S1 and S2 [Normal Rate] : heart rate was normal [Normal Bowel Sounds] : normal bowel sounds [Not Tender] : non-tender [Normal Gait] : normal gait [Soft] : abdomen soft [No Joint Swelling] : no joint swelling seen [Oriented x3] : oriented to person, place, and time [Normal Insight/Judgement] : insight and judgment were intact [de-identified] : more significant hair growth on scalp and eyebrows and now eyelashes

## 2024-03-12 NOTE — ASSESSMENT
[Denosumab Therapy] : Risks  and benefits of denosumab therapy were discussed with the patient including eczema, cellulitis, osteonecrosis of the jaw and atypical femur fractures [Levothyroxine] : The patient was instructed to take Levothyroxine on an empty stomach, separate from vitamins, and wait at least 30 minutes before eating [FreeTextEntry1] : Patient along standing history of Hashimoto's hypothyroidism.on Synthroid 88 ug but skip one pill.mos. She also has had low vitamin D will be more compliant with taking. ll.Her thyroid ultrasound shows a stable 5 mm nodule.BD did show osteoporosis which despite 2 years on Actonel did worsen in the spine but was better in the hips. Lowest site T score - 3.5 L3 was - 3.3. Discussed change to Forteo or Tymlos but does not want to do daily injection. Started  Prolia.F/U due in 3/24 . encourage weight bearing exercise vitamin D and calcium supplement BD has not shown nay significant change; just had 4th shot  Prolia  Recent wrist fracture after a fall.  .  She feels Dupixent which she takes for asthma and eczema has helped with her alopecia.  To do labs today

## 2024-06-03 ENCOUNTER — TRANSCRIPTION ENCOUNTER (OUTPATIENT)
Age: 59
End: 2024-06-03

## 2024-06-03 ENCOUNTER — RX RENEWAL (OUTPATIENT)
Age: 59
End: 2024-06-03

## 2024-06-03 LAB
25(OH)D3 SERPL-MCNC: 19 NG/ML
ALBUMIN SERPL ELPH-MCNC: 4.7 G/DL
ALP BLD-CCNC: 70 U/L
ALT SERPL-CCNC: 28 U/L
ANION GAP SERPL CALC-SCNC: 14 MMOL/L
AST SERPL-CCNC: 15 U/L
BILIRUB SERPL-MCNC: 0.2 MG/DL
BUN SERPL-MCNC: 18 MG/DL
CALCIUM SERPL-MCNC: 10 MG/DL
CHLORIDE SERPL-SCNC: 103 MMOL/L
CHOLEST SERPL-MCNC: 207 MG/DL
CO2 SERPL-SCNC: 23 MMOL/L
COLLAGEN CTX SERPL-MCNC: 135 PG/ML
CREAT SERPL-MCNC: 0.7 MG/DL
EGFR: 100 ML/MIN/1.73M2
GLUCOSE SERPL-MCNC: 110 MG/DL
HDLC SERPL-MCNC: 60 MG/DL
LDLC SERPL CALC-MCNC: 130 MG/DL
NONHDLC SERPL-MCNC: 147 MG/DL
POTASSIUM SERPL-SCNC: 4.3 MMOL/L
PROT SERPL-MCNC: 7.5 G/DL
SODIUM SERPL-SCNC: 141 MMOL/L
T4 FREE SERPL-MCNC: 1.8 NG/DL
T4 SERPL-MCNC: 9.8 UG/DL
TRIGL SERPL-MCNC: 95 MG/DL
TSH SERPL-ACNC: 0.49 UIU/ML

## 2024-06-13 ENCOUNTER — RX RENEWAL (OUTPATIENT)
Age: 59
End: 2024-06-13

## 2024-06-15 ENCOUNTER — NON-APPOINTMENT (OUTPATIENT)
Age: 59
End: 2024-06-15

## 2024-06-16 RX ORDER — LEVOTHYROXINE SODIUM 88 UG/1
88 TABLET ORAL
Qty: 90 | Refills: 0 | Status: ACTIVE | COMMUNITY
Start: 2018-01-22 | End: 1900-01-01

## 2024-08-06 ENCOUNTER — APPOINTMENT (OUTPATIENT)
Dept: OBGYN | Facility: CLINIC | Age: 59
End: 2024-08-06

## 2024-08-06 PROBLEM — Z01.419 WOMEN'S ANNUAL ROUTINE GYNECOLOGICAL EXAMINATION: Status: ACTIVE | Noted: 2024-08-06

## 2024-08-06 PROBLEM — N95.2 VAGINAL ATROPHY: Status: ACTIVE | Noted: 2024-08-06

## 2024-08-06 PROCEDURE — 99386 PREV VISIT NEW AGE 40-64: CPT

## 2024-08-07 ENCOUNTER — NON-APPOINTMENT (OUTPATIENT)
Age: 59
End: 2024-08-07

## 2024-08-07 ENCOUNTER — LABORATORY RESULT (OUTPATIENT)
Age: 59
End: 2024-08-07

## 2024-08-07 ENCOUNTER — APPOINTMENT (OUTPATIENT)
Dept: INTERNAL MEDICINE | Facility: CLINIC | Age: 59
End: 2024-08-07

## 2024-08-07 PROBLEM — Z13.39 ALCOHOL SCREENING: Status: ACTIVE | Noted: 2024-08-07

## 2024-08-07 PROBLEM — Z71.89 ADVANCED CARE PLANNING/COUNSELING DISCUSSION: Status: ACTIVE | Noted: 2024-08-07

## 2024-08-07 PROBLEM — Z13.31 DEPRESSION SCREENING: Status: ACTIVE | Noted: 2024-08-07

## 2024-08-07 PROCEDURE — 99386 PREV VISIT NEW AGE 40-64: CPT

## 2024-08-07 PROCEDURE — 36415 COLL VENOUS BLD VENIPUNCTURE: CPT

## 2024-08-07 PROCEDURE — 93000 ELECTROCARDIOGRAM COMPLETE: CPT

## 2024-08-07 PROCEDURE — 99497 ADVNCD CARE PLAN 30 MIN: CPT

## 2024-08-07 NOTE — HEALTH RISK ASSESSMENT
[Good] : ~his/her~  mood as  good [Yes] : Yes [Monthly or less (1 pt)] : Monthly or less (1 point) [1 or 2 (0 pts)] : 1 or 2 (0 points) [Any fall with injury in past year] : Patient reported fall with injury in the past year [0] : 2) Feeling down, depressed, or hopeless: Not at all (0) [Never] : Never [NO] : No [Patient reported mammogram was normal] : Patient reported mammogram was normal [Patient reported PAP Smear was normal] : Patient reported PAP Smear was normal [Patient reported bone density results were abnormal] : Patient reported bone density results were abnormal [Patient reported colonoscopy was normal] : Patient reported colonoscopy was normal [HIV test declined] : HIV test declined [Hepatitis C test declined] : Hepatitis C test declined [Spatial Ability and Orientation] : difficulty with spatial ability and orientation [None] : None [Employed] : employed [Graduate School] : graduate school [] :  [# Of Children ___] : has [unfilled] children [Sexually Active] : sexually active [Feels Safe at Home] : Feels safe at home [Fully functional (bathing, dressing, toileting, transferring, walking, feeding)] : Fully functional (bathing, dressing, toileting, transferring, walking, feeding) [Fully functional (using the telephone, shopping, preparing meals, housekeeping, doing laundry, using] : Fully functional and needs no help or supervision to perform IADLs (using the telephone, shopping, preparing meals, housekeeping, doing laundry, using transportation, managing medications and managing finances) [Smoke Detector] : smoke detector [Carbon Monoxide Detector] : carbon monoxide detector [Safety elements used in home] : safety elements used in home [Sunscreen] : uses sunscreen [With Patient/Caregiver] : , with patient/caregiver [Name: ___] : Health Care Proxy's Name: [unfilled]  [Relationship: ___] : Relationship: [unfilled] [Aggressive treatment] : aggressive treatment [I will adhere to the patient's wishes.] : I will adhere to the patient's wishes. [Time Spent: ___ minutes] : Time Spent: [unfilled] minutes [PHQ-2 Negative - No further assessment needed] : PHQ-2 Negative - No further assessment needed [I have developed a follow-up plan documented below in the note.] : I have developed a follow-up plan documented below in the note. [Time Spent: ___ Minutes] : I spent [unfilled] minutes performing a depression screening for this patient. [With Family] : lives with family [Seat Belt] :  uses seat belt [Designated Healthcare Proxy] : Designated healthcare proxy [de-identified] : socially [de-identified] : fracture left  Wrist  [[id number]] : 0 [Change in mental status noted] : No change in mental status noted [Language] : denies difficulty with language [Behavior] : denies difficulty with behavior [Learning/Retaining New Information] : denies difficulty learning/retaining new information [Handling Complex Tasks] : denies difficulty handling complex tasks [Reasoning] : denies difficulty with reasoning [Reports changes in hearing] : Reports no changes in hearing [Reports changes in vision] : Reports no changes in vision [Reports normal functional visual acuity (ie: able to read med bottle)] : Reports poor functional visual acuity.  [Reports changes in dental health] : Reports no changes in dental health [Travel to Developing Areas] : does not  travel to developing areas [TB Exposure] : is not being exposed to tuberculosis [Caregiver Concerns] : does not have caregiver concerns [MammogramDate] : 2022 [MammogramComments] : had referral yesterday from OBGYN [PapSmearDate] : 2022 [PapSmearComments] : had referral yesterday from OBGYN [BoneDensityDate] : 2023 [BoneDensityComments] : Ostesporosis. [ColonoscopyDate] : 2013 [ColonoscopyComments] : referral wll be provide [de-identified] : daughter, Son, spouse [de-identified] :  [AdvancecareDate] : 08/2024 [FreeTextEntry4] : Brook Diop. [phone]

## 2024-08-07 NOTE — COUNSELING
[Fall prevention counseling provided] : Fall prevention counseling provided [Adequate lighting] : Adequate lighting [No throw rugs] : No throw rugs [Use proper foot wear] : Use proper foot wear [Use recommended devices] : Use recommended devices [Behavioral health counseling provided] : Behavioral health counseling provided [Sleep ___ hours/day] : Sleep [unfilled] hours/day [Engage in a relaxing activity] : Engage in a relaxing activity [Plan in advance] : Plan in advance [Potential consequences of obesity discussed] : Potential consequences of obesity discussed [Benefits of weight loss discussed] : Benefits of weight loss discussed [Structured Weight Management Program suggested:] : Structured weight management program suggested [Encouraged to maintain food diary] : Encouraged to maintain food diary [Encouraged to increase physical activity] : Encouraged to increase physical activity [Encouraged to use exercise tracking device] : Encouraged to use exercise tracking device [Target Wt Loss Goal ___] : Weight Loss Goals: Target weight loss goal [unfilled] lbs [Weigh Self Weekly] : weigh self weekly [Decrease Portions] : decrease portions [____ min/wk Activity] : [unfilled] min/wk activity [Keep Food Diary] : keep food diary [None] : None [Good understanding] : Patient has a good understanding of lifestyle changes and steps needed to achieve self management goal [FreeTextEntry2] : Lowcholesterol diet. Avoid fried foods, red meat, butter, eggs, hard cheeses. Use canola or olive oil preferred.    - Encouraged a low fat/low cholesterol diet   - Discussed Healthy eating, avoidance of concentrated sweets, and to include vegetables by at least 3 meals a day   - encouraged low glycemic fruits, grains and vegetables and a diet high in plant protein   - Discussed regular exercise   - Discussed importance of follow up physician visits [FreeTextEntry4] : 16 [de-identified] :  Total face-to-face time with patient - _40_ minutes; >50% involved counselling, review of labs/tests, and/or coordination of medical care:  -Medical Annual wellness visit completed: -HRA completed and reviewed with patient -Medical, family, surgical history reviewed with patient and updated -List of current providers r/w patient and updated -Vitals, BMI reviewed and discussed along with healthy BMI goals. Dietary counseling x 15 minutes provided -Depression PHQ 9 completed and reviewed -Annual safety assessment reviewed -discussed advanced directives  -smoking cessation counseling provided  -Established routine screening and immunization schedule      VACCINATION & OTHER TX RECOMMENDATIONS     ASA preventative therapy   Calcium/Vitamin D supplementation      Dietary counseling, nutrition referral   risks vs. benefits d/w patient. routine vaccination and vaccination schedules and recommendation d/w patient       Vaccines recommended:  * pneumovax (once after 65) & prevnar  * annual Influenza vaccine  * Hep B vaccines  * zostavax  * Tdap     Colorectal screening recommended; screening colonoscopy q10yr, flex sig q5yr, annual fecal occult testing  BMD recommended biennially for osteoporosis screening  Glaucoma screening recommended, annual optho evals  Cardiovascular screening and blood tests recommended and discussed w/ patient, cholesterol screening and dietary counseling  AAA recommended x 1  diet and exercise weight loss. Low-salt low-fat ADA diet/ htn- Discussed diabetes physiology -Discussed importance of monitoring blood glucose levels -Encouraged a low/fat cholesterol diet -Discussed symptoms of hyperglycemia and hypoglycemia -Discussed ADA glucose goals -Discussed HGB A1C and the effects of blood glucose on the level -Discussed Healthy eating, avoidance of concentrated sweets, and to include vegetables by at least 2 meals a day  -Discussed regular exercise -Discussed importance of follow up physician visits. Limit intake of Sodium (salt) to less than 2 grams a day to prevent fluid retention-swelling or worsening of symptoms. The importance of keeping the blood pressure at or below 130/80 to prevent stroke, heart attacks, kidney failure, blindness, and loss of limbs was low chol diet. Avoid fried foods, red meat, butter, eggs, hard cheeses. Use canola or olive oil preferred. Was established in which goals would be set, monitoring would be done, and problem solving would be addressed. The patient would be assisted using behavior change technique, such as self-help and counseling through behavioral modification: Problem solving using hypnosis and positive medical reinforcement to achieve agreed-upon goals.  Dscd. D/E wt.loss needs to loose 10% TBW. DSCD.self monitoring, goal setting, problem solving w-b mod. Portion control, avoidance of skipping meals, high glycemic foods, snacking and mindless eating in front of the TV. Suggested use of small plates and not keeping all of the food on the dinner table and leaving it at the stove top. Pt was also told to calorie count and an kathie was recommended DSCD exercising 20 minutes per day: dscd:med /pharmaco bariatric tx options.     - Encouraged a low fat/low cholesterol diet   - Discussed Healthy eating, avoidance of concentrated sweets, and to include vegetables by at least 3 meals a day   - encouraged low glycemic fruits, grains and vegetables and a diet high in plant protein   - Discussed regular exercise   - Discussed importance of follow up physician visits

## 2024-08-07 NOTE — ASSESSMENT
[FreeTextEntry1] :  Prolonged Vit D Deficiency can lead to secondary Hyperparathyroidism and Osteomalacia.  symptoms can include Bone pain, Muscle weakness.  Low  cholesterol diet. Avoid fried foods, red meat, butter, eggs, hard cheeses. Use canola or olive oil preferred.    - Encouraged a low fat/low cholesterol diet   - Discussed Healthy eating, avoidance of concentrated sweets, and to include vegetables by at least 3 meals a day   - encouraged low glycemic fruits, grains and vegetables and a diet high in plant protein   - Discussed regular exercise   - Discussed importance of follow up physician visits

## 2024-08-07 NOTE — HEALTH RISK ASSESSMENT
[Good] : ~his/her~  mood as  good [Yes] : Yes [Monthly or less (1 pt)] : Monthly or less (1 point) [1 or 2 (0 pts)] : 1 or 2 (0 points) [Any fall with injury in past year] : Patient reported fall with injury in the past year [0] : 2) Feeling down, depressed, or hopeless: Not at all (0) [Never] : Never [NO] : No [Patient reported mammogram was normal] : Patient reported mammogram was normal [Patient reported PAP Smear was normal] : Patient reported PAP Smear was normal [Patient reported bone density results were abnormal] : Patient reported bone density results were abnormal [Patient reported colonoscopy was normal] : Patient reported colonoscopy was normal [HIV test declined] : HIV test declined [Hepatitis C test declined] : Hepatitis C test declined [Spatial Ability and Orientation] : difficulty with spatial ability and orientation [None] : None [Employed] : employed [Graduate School] : graduate school [] :  [# Of Children ___] : has [unfilled] children [Sexually Active] : sexually active [Feels Safe at Home] : Feels safe at home [Fully functional (bathing, dressing, toileting, transferring, walking, feeding)] : Fully functional (bathing, dressing, toileting, transferring, walking, feeding) [Fully functional (using the telephone, shopping, preparing meals, housekeeping, doing laundry, using] : Fully functional and needs no help or supervision to perform IADLs (using the telephone, shopping, preparing meals, housekeeping, doing laundry, using transportation, managing medications and managing finances) [Smoke Detector] : smoke detector [Carbon Monoxide Detector] : carbon monoxide detector [Safety elements used in home] : safety elements used in home [Sunscreen] : uses sunscreen [With Patient/Caregiver] : , with patient/caregiver [Name: ___] : Health Care Proxy's Name: [unfilled]  [Relationship: ___] : Relationship: [unfilled] [Aggressive treatment] : aggressive treatment [I will adhere to the patient's wishes.] : I will adhere to the patient's wishes. [Time Spent: ___ minutes] : Time Spent: [unfilled] minutes [PHQ-2 Negative - No further assessment needed] : PHQ-2 Negative - No further assessment needed [I have developed a follow-up plan documented below in the note.] : I have developed a follow-up plan documented below in the note. [Time Spent: ___ Minutes] : I spent [unfilled] minutes performing a depression screening for this patient. [With Family] : lives with family [Seat Belt] :  uses seat belt [Designated Healthcare Proxy] : Designated healthcare proxy [de-identified] : socially [de-identified] : fracture left  Wrist  [CTX9Minbc] : 0 [Change in mental status noted] : No change in mental status noted [Language] : denies difficulty with language [Behavior] : denies difficulty with behavior [Learning/Retaining New Information] : denies difficulty learning/retaining new information [Handling Complex Tasks] : denies difficulty handling complex tasks [Reasoning] : denies difficulty with reasoning [Reports changes in hearing] : Reports no changes in hearing [Reports changes in vision] : Reports no changes in vision [Reports normal functional visual acuity (ie: able to read med bottle)] : Reports poor functional visual acuity.  [Reports changes in dental health] : Reports no changes in dental health [Travel to Developing Areas] : does not  travel to developing areas [TB Exposure] : is not being exposed to tuberculosis [Caregiver Concerns] : does not have caregiver concerns [MammogramDate] : 2022 [MammogramComments] : had referral yesterday from OBGYN [PapSmearDate] : 2022 [PapSmearComments] : had referral yesterday from OBGYN [BoneDensityDate] : 2023 [BoneDensityComments] : Ostesporosis. [ColonoscopyDate] : 2013 [ColonoscopyComments] : referral wll be provide [de-identified] : daughter, Son, spouse [de-identified] :  [AdvancecareDate] : 08/2024 [FreeTextEntry4] : Brook Diop. [phone]

## 2024-08-07 NOTE — HISTORY OF PRESENT ILLNESS
[FreeTextEntry1] : 59 year -old female presents for annual Physical.  [de-identified] : 58 y/o Female here to establish care.  Initial visit. Annual Visit. states more than  3 years since the last time visited PCP under the care of endocrinology and Allergist. PMHX: asthma, eczema Hashimoto Hypothyroid osteoporosis. No acute complaints. No refills are needed.

## 2024-08-07 NOTE — HISTORY OF PRESENT ILLNESS
[FreeTextEntry1] : 59 year -old female presents for annual Physical.  [de-identified] : 58 y/o Female here to establish care.  Initial visit. Annual Visit. states more than  3 years since the last time visited PCP under the care of endocrinology and Allergist. PMHX: asthma, eczema Hashimoto Hypothyroid osteoporosis. No acute complaints. No refills are needed.

## 2024-08-07 NOTE — COUNSELING
[Fall prevention counseling provided] : Fall prevention counseling provided [Adequate lighting] : Adequate lighting [No throw rugs] : No throw rugs [Use proper foot wear] : Use proper foot wear [Use recommended devices] : Use recommended devices [Behavioral health counseling provided] : Behavioral health counseling provided [Sleep ___ hours/day] : Sleep [unfilled] hours/day [Engage in a relaxing activity] : Engage in a relaxing activity [Plan in advance] : Plan in advance [Potential consequences of obesity discussed] : Potential consequences of obesity discussed [Benefits of weight loss discussed] : Benefits of weight loss discussed [Structured Weight Management Program suggested:] : Structured weight management program suggested [Encouraged to maintain food diary] : Encouraged to maintain food diary [Encouraged to increase physical activity] : Encouraged to increase physical activity [Encouraged to use exercise tracking device] : Encouraged to use exercise tracking device [Target Wt Loss Goal ___] : Weight Loss Goals: Target weight loss goal [unfilled] lbs [Weigh Self Weekly] : weigh self weekly [Decrease Portions] : decrease portions [____ min/wk Activity] : [unfilled] min/wk activity [Keep Food Diary] : keep food diary [None] : None [Good understanding] : Patient has a good understanding of lifestyle changes and steps needed to achieve self management goal [FreeTextEntry2] : Lowcholesterol diet. Avoid fried foods, red meat, butter, eggs, hard cheeses. Use canola or olive oil preferred.    - Encouraged a low fat/low cholesterol diet   - Discussed Healthy eating, avoidance of concentrated sweets, and to include vegetables by at least 3 meals a day   - encouraged low glycemic fruits, grains and vegetables and a diet high in plant protein   - Discussed regular exercise   - Discussed importance of follow up physician visits [FreeTextEntry4] : 16 [de-identified] :  Total face-to-face time with patient - _40_ minutes; >50% involved counselling, review of labs/tests, and/or coordination of medical care:  -Medical Annual wellness visit completed: -HRA completed and reviewed with patient -Medical, family, surgical history reviewed with patient and updated -List of current providers r/w patient and updated -Vitals, BMI reviewed and discussed along with healthy BMI goals. Dietary counseling x 15 minutes provided -Depression PHQ 9 completed and reviewed -Annual safety assessment reviewed -discussed advanced directives  -smoking cessation counseling provided  -Established routine screening and immunization schedule      VACCINATION & OTHER TX RECOMMENDATIONS     ASA preventative therapy   Calcium/Vitamin D supplementation      Dietary counseling, nutrition referral   risks vs. benefits d/w patient. routine vaccination and vaccination schedules and recommendation d/w patient       Vaccines recommended:  * pneumovax (once after 65) & prevnar  * annual Influenza vaccine  * Hep B vaccines  * zostavax  * Tdap     Colorectal screening recommended; screening colonoscopy q10yr, flex sig q5yr, annual fecal occult testing  BMD recommended biennially for osteoporosis screening  Glaucoma screening recommended, annual optho evals  Cardiovascular screening and blood tests recommended and discussed w/ patient, cholesterol screening and dietary counseling  AAA recommended x 1  diet and exercise weight loss. Low-salt low-fat ADA diet/ htn- Discussed diabetes physiology -Discussed importance of monitoring blood glucose levels -Encouraged a low/fat cholesterol diet -Discussed symptoms of hyperglycemia and hypoglycemia -Discussed ADA glucose goals -Discussed HGB A1C and the effects of blood glucose on the level -Discussed Healthy eating, avoidance of concentrated sweets, and to include vegetables by at least 2 meals a day  -Discussed regular exercise -Discussed importance of follow up physician visits. Limit intake of Sodium (salt) to less than 2 grams a day to prevent fluid retention-swelling or worsening of symptoms. The importance of keeping the blood pressure at or below 130/80 to prevent stroke, heart attacks, kidney failure, blindness, and loss of limbs was low chol diet. Avoid fried foods, red meat, butter, eggs, hard cheeses. Use canola or olive oil preferred. Was established in which goals would be set, monitoring would be done, and problem solving would be addressed. The patient would be assisted using behavior change technique, such as self-help and counseling through behavioral modification: Problem solving using hypnosis and positive medical reinforcement to achieve agreed-upon goals.  Dscd. D/E wt.loss needs to loose 10% TBW. DSCD.self monitoring, goal setting, problem solving w-b mod. Portion control, avoidance of skipping meals, high glycemic foods, snacking and mindless eating in front of the TV. Suggested use of small plates and not keeping all of the food on the dinner table and leaving it at the stove top. Pt was also told to calorie count and an kathie was recommended DSCD exercising 20 minutes per day: dscd:med /pharmaco bariatric tx options.     - Encouraged a low fat/low cholesterol diet   - Discussed Healthy eating, avoidance of concentrated sweets, and to include vegetables by at least 3 meals a day   - encouraged low glycemic fruits, grains and vegetables and a diet high in plant protein   - Discussed regular exercise   - Discussed importance of follow up physician visits

## 2024-09-12 ENCOUNTER — APPOINTMENT (OUTPATIENT)
Dept: ENDOCRINOLOGY | Facility: CLINIC | Age: 59
End: 2024-09-12
Payer: COMMERCIAL

## 2024-09-12 VITALS
BODY MASS INDEX: 28.59 KG/M2 | SYSTOLIC BLOOD PRESSURE: 124 MMHG | HEART RATE: 70 BPM | OXYGEN SATURATION: 96 % | HEIGHT: 62 IN | WEIGHT: 155.38 LBS | DIASTOLIC BLOOD PRESSURE: 78 MMHG

## 2024-09-12 DIAGNOSIS — E03.9 HYPOTHYROIDISM, UNSPECIFIED: ICD-10-CM

## 2024-09-12 DIAGNOSIS — E78.5 HYPERLIPIDEMIA, UNSPECIFIED: ICD-10-CM

## 2024-09-12 DIAGNOSIS — M81.0 AGE-RELATED OSTEOPOROSIS W/OUT CURRENT PATHOLOGICAL FRACTURE: ICD-10-CM

## 2024-09-12 DIAGNOSIS — E04.9 NONTOXIC GOITER, UNSPECIFIED: ICD-10-CM

## 2024-09-12 DIAGNOSIS — Z78.0 ASYMPTOMATIC MENOPAUSAL STATE: ICD-10-CM

## 2024-09-12 DIAGNOSIS — L65.9 NONSCARRING HAIR LOSS, UNSPECIFIED: ICD-10-CM

## 2024-09-12 DIAGNOSIS — R79.89 OTHER SPECIFIED ABNORMAL FINDINGS OF BLOOD CHEMISTRY: ICD-10-CM

## 2024-09-12 PROCEDURE — 36415 COLL VENOUS BLD VENIPUNCTURE: CPT

## 2024-09-12 PROCEDURE — 99214 OFFICE O/P EST MOD 30 MIN: CPT | Mod: 25

## 2024-09-12 PROCEDURE — 96401 CHEMO ANTI-NEOPL SQ/IM: CPT

## 2024-09-12 RX ORDER — DENOSUMAB 60 MG/ML
60 INJECTION SUBCUTANEOUS
Qty: 1 | Refills: 0 | Status: COMPLETED | OUTPATIENT
Start: 2024-09-12

## 2024-09-12 RX ADMIN — DENOSUMAB 60 MG/ML: 60 INJECTION SUBCUTANEOUS at 00:00

## 2024-09-12 NOTE — PHYSICAL EXAM
[Alert] : alert [No Acute Distress] : no acute distress [Normal Voice/Communication] : normal voice communication [Normal Sclera/Conjunctiva] : normal sclera/conjunctiva [PERRL] : pupils equal, round and reactive to light [Thyroid Not Enlarged] : the thyroid was not enlarged [No Thyroid Nodules] : no palpable thyroid nodules [No Respiratory Distress] : no respiratory distress [Clear to Auscultation] : lungs were clear to auscultation bilaterally [Normal PMI] : the apical impulse was normal [Normal S1, S2] : normal S1 and S2 [Normal Rate] : heart rate was normal [Normal Bowel Sounds] : normal bowel sounds [Not Tender] : non-tender [Soft] : abdomen soft [Normal Gait] : normal gait [No Joint Swelling] : no joint swelling seen [Oriented x3] : oriented to person, place, and time [Normal Insight/Judgement] : insight and judgment were intact [de-identified] : more significant hair growth on scalp and eyebrows and now eyelashes

## 2024-09-12 NOTE — ASSESSMENT
[Denosumab Therapy] : Risks  and benefits of denosumab therapy were discussed with the patient including eczema, cellulitis, osteonecrosis of the jaw and atypical femur fractures [Levothyroxine] : The patient was instructed to take Levothyroxine on an empty stomach, separate from vitamins, and wait at least 30 minutes before eating [FreeTextEntry1] : Patient along standing history of Hashimoto's hypothyroidism.on Synthroid 88 ug but skip one pill.mos. She also has had low vitamin D will be more compliant with taking. ll.Her thyroid ultrasound shows a stable 5 mm nodule.BD did show osteoporosis which despite 2 years on Actonel did worsen in the spine but was better in the hips. Lowest site T score - 3.5 L3 was - 3.3. Discussed change to Forteo or Tymlos but does not want to do daily injection. Started  Prolia.F/U due in 3/24 . encourage weight bearing exercise vitamin D and calcium supplement BD has not shown nay significant change; just had 5th shot  Prolia  BD in march 2025  Recent wrist fracture after a fall.  .  She feels Dupixent which she takes for asthma and eczema has helped with her alopecia.  To do labs today

## 2024-09-12 NOTE — HISTORY OF PRESENT ILLNESS
[FreeTextEntry1] : Patient with a history of hypothyroidism and has been stable on the medication for the past few years. She is presently on Synthroid 88 mcg a day. She also has alopecia areata with mild growth of hair on her scalp. Eczema and alopecia much improved except  on Dupixent.. .Since she has been doing a wheat free she feels her skin significantly better.   BD showed osteoporosis.  She has no h/o of fractures but has been on and off steroids for asthma most of her life as well as chronic use of steroid inhalants.  She started Actonel 2 years ago without any side effects. Sometimes she does feel fatigued for a few days, She is on Vitamin D and calcium supplements. BD recently done somewhat worse. Recently started Prolia and had no reaction.  Fell a few mos  ago and FX her left wrist.

## 2024-09-19 ENCOUNTER — TRANSCRIPTION ENCOUNTER (OUTPATIENT)
Age: 59
End: 2024-09-19

## 2024-12-17 ENCOUNTER — RX RENEWAL (OUTPATIENT)
Age: 59
End: 2024-12-17

## 2024-12-23 ENCOUNTER — RX RENEWAL (OUTPATIENT)
Age: 59
End: 2024-12-23

## 2025-02-06 ENCOUNTER — APPOINTMENT (OUTPATIENT)
Dept: INTERNAL MEDICINE | Facility: CLINIC | Age: 60
End: 2025-02-06
Payer: COMMERCIAL

## 2025-02-06 VITALS
TEMPERATURE: 97.8 F | HEART RATE: 80 BPM | SYSTOLIC BLOOD PRESSURE: 122 MMHG | DIASTOLIC BLOOD PRESSURE: 78 MMHG | BODY MASS INDEX: 28.8 KG/M2 | HEIGHT: 62 IN | WEIGHT: 156.5 LBS | OXYGEN SATURATION: 97 % | RESPIRATION RATE: 16 BRPM

## 2025-02-06 DIAGNOSIS — M81.0 AGE-RELATED OSTEOPOROSIS W/OUT CURRENT PATHOLOGICAL FRACTURE: ICD-10-CM

## 2025-02-06 DIAGNOSIS — E03.9 HYPOTHYROIDISM, UNSPECIFIED: ICD-10-CM

## 2025-02-06 DIAGNOSIS — J45.909 UNSPECIFIED ASTHMA, UNCOMPLICATED: ICD-10-CM

## 2025-02-06 DIAGNOSIS — E78.5 HYPERLIPIDEMIA, UNSPECIFIED: ICD-10-CM

## 2025-02-06 DIAGNOSIS — E55.9 VITAMIN D DEFICIENCY, UNSPECIFIED: ICD-10-CM

## 2025-02-06 PROCEDURE — G2211 COMPLEX E/M VISIT ADD ON: CPT | Mod: NC

## 2025-02-06 PROCEDURE — 99214 OFFICE O/P EST MOD 30 MIN: CPT

## 2025-02-06 RX ORDER — ERGOCALCIFEROL 1.25 MG/1
50000 CAPSULE ORAL
Qty: 4 | Refills: 2 | Status: ACTIVE | COMMUNITY
Start: 2025-02-06 | End: 1900-01-01

## 2025-03-20 DIAGNOSIS — Z78.0 ASYMPTOMATIC MENOPAUSAL STATE: ICD-10-CM

## 2025-03-20 DIAGNOSIS — R79.89 OTHER SPECIFIED ABNORMAL FINDINGS OF BLOOD CHEMISTRY: ICD-10-CM

## 2025-03-24 LAB
25(OH)D3 SERPL-MCNC: 37.7 NG/ML
ALBUMIN SERPL ELPH-MCNC: 4.7 G/DL
ALP BLD-CCNC: 81 U/L
ALT SERPL-CCNC: 35 U/L
ANION GAP SERPL CALC-SCNC: 14 MMOL/L
AST SERPL-CCNC: 19 U/L
BILIRUB SERPL-MCNC: 0.4 MG/DL
BUN SERPL-MCNC: 14 MG/DL
CALCIUM SERPL-MCNC: 10.3 MG/DL
CHLORIDE SERPL-SCNC: 103 MMOL/L
CHOLEST SERPL-MCNC: 219 MG/DL
CO2 SERPL-SCNC: 24 MMOL/L
CREAT SERPL-MCNC: 0.66 MG/DL
EGFRCR SERPLBLD CKD-EPI 2021: 100 ML/MIN/1.73M2
ESTIMATED AVERAGE GLUCOSE: 120 MG/DL
GLUCOSE SERPL-MCNC: 104 MG/DL
HBA1C MFR BLD HPLC: 5.8 %
HDLC SERPL-MCNC: 53 MG/DL
LDLC SERPL-MCNC: 142 MG/DL
NONHDLC SERPL-MCNC: 167 MG/DL
POTASSIUM SERPL-SCNC: 4.5 MMOL/L
PROT SERPL-MCNC: 7.6 G/DL
SODIUM SERPL-SCNC: 140 MMOL/L
T4 FREE SERPL-MCNC: 1.7 NG/DL
T4 SERPL-MCNC: 11.3 UG/DL
TRIGL SERPL-MCNC: 135 MG/DL
TSH SERPL-ACNC: 0.49 UIU/ML

## 2025-03-25 ENCOUNTER — APPOINTMENT (OUTPATIENT)
Dept: INTERNAL MEDICINE | Facility: CLINIC | Age: 60
End: 2025-03-25
Payer: COMMERCIAL

## 2025-03-25 VITALS
TEMPERATURE: 97.6 F | BODY MASS INDEX: 28.52 KG/M2 | OXYGEN SATURATION: 96 % | RESPIRATION RATE: 18 BRPM | WEIGHT: 155 LBS | HEART RATE: 78 BPM | SYSTOLIC BLOOD PRESSURE: 126 MMHG | HEIGHT: 62 IN | DIASTOLIC BLOOD PRESSURE: 82 MMHG

## 2025-03-25 DIAGNOSIS — J45.909 UNSPECIFIED ASTHMA, UNCOMPLICATED: ICD-10-CM

## 2025-03-25 PROCEDURE — 99214 OFFICE O/P EST MOD 30 MIN: CPT

## 2025-03-25 PROCEDURE — G2211 COMPLEX E/M VISIT ADD ON: CPT | Mod: NC

## 2025-03-25 RX ORDER — TIOTROPIUM BROMIDE INHALATION SPRAY 3.12 UG/1
2.5 SPRAY, METERED RESPIRATORY (INHALATION)
Qty: 1 | Refills: 5 | Status: ACTIVE | COMMUNITY
Start: 2025-03-25 | End: 1900-01-01

## 2025-03-27 ENCOUNTER — APPOINTMENT (OUTPATIENT)
Dept: ENDOCRINOLOGY | Facility: CLINIC | Age: 60
End: 2025-03-27
Payer: COMMERCIAL

## 2025-03-27 VITALS
DIASTOLIC BLOOD PRESSURE: 73 MMHG | WEIGHT: 154 LBS | OXYGEN SATURATION: 97 % | SYSTOLIC BLOOD PRESSURE: 159 MMHG | HEIGHT: 62 IN | BODY MASS INDEX: 28.34 KG/M2 | HEART RATE: 80 BPM

## 2025-03-27 DIAGNOSIS — E03.9 HYPOTHYROIDISM, UNSPECIFIED: ICD-10-CM

## 2025-03-27 DIAGNOSIS — M81.0 AGE-RELATED OSTEOPOROSIS W/OUT CURRENT PATHOLOGICAL FRACTURE: ICD-10-CM

## 2025-03-27 DIAGNOSIS — E78.5 HYPERLIPIDEMIA, UNSPECIFIED: ICD-10-CM

## 2025-03-27 DIAGNOSIS — E04.9 NONTOXIC GOITER, UNSPECIFIED: ICD-10-CM

## 2025-03-27 PROCEDURE — 99214 OFFICE O/P EST MOD 30 MIN: CPT | Mod: 25

## 2025-03-27 PROCEDURE — 96401 CHEMO ANTI-NEOPL SQ/IM: CPT

## 2025-03-27 RX ORDER — DENOSUMAB 60 MG/ML
60 INJECTION SUBCUTANEOUS
Qty: 1 | Refills: 0 | Status: COMPLETED | OUTPATIENT
Start: 2025-03-27

## 2025-03-27 RX ADMIN — DENOSUMAB 0 MG/ML: 60 INJECTION SUBCUTANEOUS at 00:00

## 2025-03-28 LAB — COLLAGEN CTX SERPL-MCNC: 266 PG/ML

## 2025-06-10 ENCOUNTER — TRANSCRIPTION ENCOUNTER (OUTPATIENT)
Age: 60
End: 2025-06-10

## 2025-06-10 ENCOUNTER — RX RENEWAL (OUTPATIENT)
Age: 60
End: 2025-06-10

## 2025-08-05 ENCOUNTER — TRANSCRIPTION ENCOUNTER (OUTPATIENT)
Age: 60
End: 2025-08-05

## 2025-09-15 ENCOUNTER — APPOINTMENT (OUTPATIENT)
Facility: CLINIC | Age: 60
End: 2025-09-15
Payer: COMMERCIAL

## 2025-09-15 VITALS
DIASTOLIC BLOOD PRESSURE: 92 MMHG | BODY MASS INDEX: 30 KG/M2 | HEIGHT: 62 IN | WEIGHT: 163 LBS | SYSTOLIC BLOOD PRESSURE: 150 MMHG

## 2025-09-15 DIAGNOSIS — R92.30 DENSE BREASTS, UNSPECIFIED: ICD-10-CM

## 2025-09-15 DIAGNOSIS — Z01.419 ENCOUNTER FOR GYNECOLOGICAL EXAMINATION (GENERAL) (ROUTINE) W/OUT ABNORMAL FINDINGS: ICD-10-CM

## 2025-09-15 PROCEDURE — 99396 PREV VISIT EST AGE 40-64: CPT

## 2025-09-17 LAB — HPV HIGH+LOW RISK DNA PNL CVX: NOT DETECTED

## 2025-09-22 LAB — CYTOLOGY CVX/VAG DOC THIN PREP: ABNORMAL
